# Patient Record
Sex: FEMALE | Race: WHITE | Employment: OTHER | URBAN - NONMETROPOLITAN AREA
[De-identification: names, ages, dates, MRNs, and addresses within clinical notes are randomized per-mention and may not be internally consistent; named-entity substitution may affect disease eponyms.]

---

## 2017-01-01 ENCOUNTER — APPOINTMENT (OUTPATIENT)
Dept: GENERAL RADIOLOGY | Age: 67
End: 2017-01-01
Payer: MEDICARE

## 2017-01-01 ENCOUNTER — APPOINTMENT (OUTPATIENT)
Dept: CT IMAGING | Age: 67
End: 2017-01-01
Payer: MEDICARE

## 2017-01-01 ENCOUNTER — HOSPITAL ENCOUNTER (OUTPATIENT)
Age: 67
Setting detail: OBSERVATION
Discharge: HOME | End: 2017-12-18
Attending: EMERGENCY MEDICINE | Admitting: INTERNAL MEDICINE
Payer: MEDICARE

## 2017-01-01 VITALS
OXYGEN SATURATION: 93 % | HEART RATE: 91 BPM | DIASTOLIC BLOOD PRESSURE: 69 MMHG | SYSTOLIC BLOOD PRESSURE: 107 MMHG | WEIGHT: 133.38 LBS | TEMPERATURE: 97.7 F | RESPIRATION RATE: 20 BRPM | HEIGHT: 62 IN | BODY MASS INDEX: 24.54 KG/M2

## 2017-01-01 DIAGNOSIS — R01.1 HEART MURMUR: ICD-10-CM

## 2017-01-01 DIAGNOSIS — R55 SYNCOPE AND COLLAPSE: ICD-10-CM

## 2017-01-01 DIAGNOSIS — R26.2 AMBULATORY DYSFUNCTION: Primary | ICD-10-CM

## 2017-01-01 LAB
ALBUMIN SERPL-MCNC: 4.3 G/DL (ref 3.5–5.2)
ALP BLD-CCNC: 72 U/L (ref 35–104)
ALT SERPL-CCNC: 100 U/L (ref 5–33)
ANION GAP SERPL CALCULATED.3IONS-SCNC: 11 MMOL/L (ref 7–19)
ANION GAP SERPL CALCULATED.3IONS-SCNC: 20 MMOL/L (ref 7–19)
APTT: 22.9 SEC (ref 26–36.2)
AST SERPL-CCNC: 51 U/L (ref 5–32)
BASOPHILS ABSOLUTE: 0 K/UL (ref 0–0.2)
BASOPHILS ABSOLUTE: 0 K/UL (ref 0–0.2)
BASOPHILS RELATIVE PERCENT: 0.4 % (ref 0–1)
BASOPHILS RELATIVE PERCENT: 0.5 % (ref 0–1)
BILIRUB SERPL-MCNC: 0.8 MG/DL (ref 0.2–1.2)
BILIRUBIN URINE: ABNORMAL
BLOOD, URINE: NEGATIVE
BUN BLDV-MCNC: 19 MG/DL (ref 8–23)
BUN BLDV-MCNC: 36 MG/DL (ref 8–23)
CALCIUM SERPL-MCNC: 10.4 MG/DL (ref 8.8–10.2)
CALCIUM SERPL-MCNC: 7.9 MG/DL (ref 8.8–10.2)
CHLORIDE BLD-SCNC: 103 MMOL/L (ref 98–111)
CHLORIDE BLD-SCNC: 91 MMOL/L (ref 98–111)
CLARITY: CLEAR
CO2: 22 MMOL/L (ref 22–29)
CO2: 26 MMOL/L (ref 22–29)
COLOR: YELLOW
CREAT SERPL-MCNC: 0.6 MG/DL (ref 0.5–0.9)
CREAT SERPL-MCNC: 1 MG/DL (ref 0.5–0.9)
EKG P AXIS: 48 DEGREES
EKG P-R INTERVAL: 124 MS
EKG Q-T INTERVAL: 302 MS
EKG QRS DURATION: 66 MS
EKG QTC CALCULATION (BAZETT): 405 MS
EKG T AXIS: 87 DEGREES
EOSINOPHILS ABSOLUTE: 0 K/UL (ref 0–0.6)
EOSINOPHILS ABSOLUTE: 0.1 K/UL (ref 0–0.6)
EOSINOPHILS RELATIVE PERCENT: 0.1 % (ref 0–5)
EOSINOPHILS RELATIVE PERCENT: 1.2 % (ref 0–5)
GFR NON-AFRICAN AMERICAN: 55
GFR NON-AFRICAN AMERICAN: >60
GLUCOSE BLD-MCNC: 159 MG/DL (ref 70–99)
GLUCOSE BLD-MCNC: 167 MG/DL (ref 70–99)
GLUCOSE BLD-MCNC: 170 MG/DL (ref 74–109)
GLUCOSE BLD-MCNC: 179 MG/DL (ref 70–99)
GLUCOSE BLD-MCNC: 181 MG/DL (ref 70–99)
GLUCOSE BLD-MCNC: 186 MG/DL (ref 70–99)
GLUCOSE BLD-MCNC: 192 MG/DL (ref 70–99)
GLUCOSE BLD-MCNC: 221 MG/DL (ref 70–99)
GLUCOSE BLD-MCNC: 246 MG/DL (ref 70–99)
GLUCOSE BLD-MCNC: 341 MG/DL (ref 70–99)
GLUCOSE BLD-MCNC: 370 MG/DL (ref 74–109)
GLUCOSE URINE: >=1000 MG/DL
HBA1C MFR BLD: 7.4 %
HCT VFR BLD CALC: 32.7 % (ref 37–47)
HCT VFR BLD CALC: 39.9 % (ref 37–47)
HEMOGLOBIN: 10.9 G/DL (ref 12–16)
HEMOGLOBIN: 13.2 G/DL (ref 12–16)
INR BLD: 0.92 (ref 0.88–1.18)
KETONES, URINE: ABNORMAL MG/DL
LEUKOCYTE ESTERASE, URINE: NEGATIVE
LV EF: 50 %
LVEF MODALITY: NORMAL
LYMPHOCYTES ABSOLUTE: 1.5 K/UL (ref 1.1–4.5)
LYMPHOCYTES ABSOLUTE: 1.6 K/UL (ref 1.1–4.5)
LYMPHOCYTES RELATIVE PERCENT: 21.6 % (ref 20–40)
LYMPHOCYTES RELATIVE PERCENT: 35.9 % (ref 20–40)
MCH RBC QN AUTO: 29.7 PG (ref 27–31)
MCH RBC QN AUTO: 29.7 PG (ref 27–31)
MCHC RBC AUTO-ENTMCNC: 33.1 G/DL (ref 33–37)
MCHC RBC AUTO-ENTMCNC: 33.3 G/DL (ref 33–37)
MCV RBC AUTO: 89.1 FL (ref 81–99)
MCV RBC AUTO: 89.7 FL (ref 81–99)
MONOCYTES ABSOLUTE: 0.4 K/UL (ref 0–0.9)
MONOCYTES ABSOLUTE: 0.6 K/UL (ref 0–0.9)
MONOCYTES RELATIVE PERCENT: 8.2 % (ref 0–10)
MONOCYTES RELATIVE PERCENT: 9.2 % (ref 0–10)
NEUTROPHILS ABSOLUTE: 2.3 K/UL (ref 1.5–7.5)
NEUTROPHILS ABSOLUTE: 5.3 K/UL (ref 1.5–7.5)
NEUTROPHILS RELATIVE PERCENT: 53 % (ref 50–65)
NEUTROPHILS RELATIVE PERCENT: 69.4 % (ref 50–65)
NITRITE, URINE: NEGATIVE
PDW BLD-RTO: 13.7 % (ref 11.5–14.5)
PDW BLD-RTO: 13.9 % (ref 11.5–14.5)
PERFORMED ON: ABNORMAL
PH UA: 6
PLATELET # BLD: 202 K/UL (ref 130–400)
PLATELET # BLD: 299 K/UL (ref 130–400)
PMV BLD AUTO: 8.9 FL (ref 9.4–12.3)
PMV BLD AUTO: 9.2 FL (ref 9.4–12.3)
POTASSIUM SERPL-SCNC: 3.6 MMOL/L (ref 3.5–5)
POTASSIUM SERPL-SCNC: 4.5 MMOL/L (ref 3.5–5)
PROTEIN UA: NEGATIVE MG/DL
PROTHROMBIN TIME: 12.3 SEC (ref 12–14.6)
RBC # BLD: 3.67 M/UL (ref 4.2–5.4)
RBC # BLD: 4.45 M/UL (ref 4.2–5.4)
SODIUM BLD-SCNC: 133 MMOL/L (ref 136–145)
SODIUM BLD-SCNC: 140 MMOL/L (ref 136–145)
SPECIFIC GRAVITY UA: 1.04
TOTAL PROTEIN: 7.6 G/DL (ref 6.6–8.7)
TROPONIN: 0.03 NG/ML (ref 0–0.03)
TSH SERPL DL<=0.05 MIU/L-ACNC: 1.58 UIU/ML (ref 0.27–4.2)
UROBILINOGEN, URINE: 0.2 E.U./DL
WBC # BLD: 4.3 K/UL (ref 4.8–10.8)
WBC # BLD: 7.6 K/UL (ref 4.8–10.8)

## 2017-01-01 PROCEDURE — 36415 COLL VENOUS BLD VENIPUNCTURE: CPT

## 2017-01-01 PROCEDURE — 99285 EMERGENCY DEPT VISIT HI MDM: CPT | Performed by: EMERGENCY MEDICINE

## 2017-01-01 PROCEDURE — 70450 CT HEAD/BRAIN W/O DYE: CPT

## 2017-01-01 PROCEDURE — 84443 ASSAY THYROID STIM HORMONE: CPT

## 2017-01-01 PROCEDURE — 6360000002 HC RX W HCPCS: Performed by: INTERNAL MEDICINE

## 2017-01-01 PROCEDURE — 84484 ASSAY OF TROPONIN QUANT: CPT

## 2017-01-01 PROCEDURE — 99217 PR OBSERVATION CARE DISCHARGE MANAGEMENT: CPT | Performed by: INTERNAL MEDICINE

## 2017-01-01 PROCEDURE — 97116 GAIT TRAINING THERAPY: CPT

## 2017-01-01 PROCEDURE — 72125 CT NECK SPINE W/O DYE: CPT

## 2017-01-01 PROCEDURE — 99225 PR SBSQ OBSERVATION CARE/DAY 25 MINUTES: CPT | Performed by: INTERNAL MEDICINE

## 2017-01-01 PROCEDURE — 99220 PR INITIAL OBSERVATION CARE/DAY 70 MINUTES: CPT | Performed by: INTERNAL MEDICINE

## 2017-01-01 PROCEDURE — G0378 HOSPITAL OBSERVATION PER HR: HCPCS

## 2017-01-01 PROCEDURE — 99285 EMERGENCY DEPT VISIT HI MDM: CPT

## 2017-01-01 PROCEDURE — 96372 THER/PROPH/DIAG INJ SC/IM: CPT

## 2017-01-01 PROCEDURE — 2580000003 HC RX 258: Performed by: EMERGENCY MEDICINE

## 2017-01-01 PROCEDURE — 80053 COMPREHEN METABOLIC PANEL: CPT

## 2017-01-01 PROCEDURE — 81003 URINALYSIS AUTO W/O SCOPE: CPT

## 2017-01-01 PROCEDURE — 97162 PT EVAL MOD COMPLEX 30 MIN: CPT

## 2017-01-01 PROCEDURE — 6370000000 HC RX 637 (ALT 250 FOR IP): Performed by: INTERNAL MEDICINE

## 2017-01-01 PROCEDURE — 85730 THROMBOPLASTIN TIME PARTIAL: CPT

## 2017-01-01 PROCEDURE — 80048 BASIC METABOLIC PNL TOTAL CA: CPT

## 2017-01-01 PROCEDURE — 82948 REAGENT STRIP/BLOOD GLUCOSE: CPT

## 2017-01-01 PROCEDURE — 85025 COMPLETE CBC W/AUTO DIFF WBC: CPT

## 2017-01-01 PROCEDURE — 71010 XR CHEST PORTABLE: CPT

## 2017-01-01 PROCEDURE — 93005 ELECTROCARDIOGRAM TRACING: CPT

## 2017-01-01 PROCEDURE — G8979 MOBILITY GOAL STATUS: HCPCS

## 2017-01-01 PROCEDURE — G8978 MOBILITY CURRENT STATUS: HCPCS

## 2017-01-01 PROCEDURE — 93306 TTE W/DOPPLER COMPLETE: CPT

## 2017-01-01 PROCEDURE — 83036 HEMOGLOBIN GLYCOSYLATED A1C: CPT

## 2017-01-01 PROCEDURE — 2580000003 HC RX 258: Performed by: INTERNAL MEDICINE

## 2017-01-01 PROCEDURE — 73630 X-RAY EXAM OF FOOT: CPT

## 2017-01-01 PROCEDURE — 85610 PROTHROMBIN TIME: CPT

## 2017-01-01 RX ORDER — INSULIN GLARGINE 100 [IU]/ML
20 INJECTION, SOLUTION SUBCUTANEOUS NIGHTLY
Qty: 10 ML | Refills: 0
Start: 2017-01-01 | End: 2018-01-01 | Stop reason: SDUPTHER

## 2017-01-01 RX ORDER — ONDANSETRON 2 MG/ML
4 INJECTION INTRAMUSCULAR; INTRAVENOUS EVERY 6 HOURS PRN
Status: DISCONTINUED | OUTPATIENT
Start: 2017-01-01 | End: 2017-01-01 | Stop reason: HOSPADM

## 2017-01-01 RX ORDER — DEXTROSE MONOHYDRATE 50 MG/ML
100 INJECTION, SOLUTION INTRAVENOUS PRN
Status: DISCONTINUED | OUTPATIENT
Start: 2017-01-01 | End: 2017-01-01 | Stop reason: HOSPADM

## 2017-01-01 RX ORDER — BISACODYL 10 MG
10 SUPPOSITORY, RECTAL RECTAL DAILY PRN
Status: DISCONTINUED | OUTPATIENT
Start: 2017-01-01 | End: 2017-01-01 | Stop reason: HOSPADM

## 2017-01-01 RX ORDER — 0.9 % SODIUM CHLORIDE 0.9 %
1000 INTRAVENOUS SOLUTION INTRAVENOUS ONCE
Status: COMPLETED | OUTPATIENT
Start: 2017-01-01 | End: 2017-01-01

## 2017-01-01 RX ORDER — NICOTINE POLACRILEX 4 MG
15 LOZENGE BUCCAL PRN
Status: DISCONTINUED | OUTPATIENT
Start: 2017-01-01 | End: 2017-01-01 | Stop reason: HOSPADM

## 2017-01-01 RX ORDER — ASPIRIN 81 MG/1
81 TABLET ORAL DAILY
Status: DISCONTINUED | OUTPATIENT
Start: 2017-01-01 | End: 2017-01-01 | Stop reason: HOSPADM

## 2017-01-01 RX ORDER — INSULIN GLARGINE 100 [IU]/ML
20 INJECTION, SOLUTION SUBCUTANEOUS NIGHTLY
Status: DISCONTINUED | OUTPATIENT
Start: 2017-01-01 | End: 2017-01-01 | Stop reason: HOSPADM

## 2017-01-01 RX ORDER — SODIUM CHLORIDE 9 MG/ML
INJECTION, SOLUTION INTRAVENOUS CONTINUOUS
Status: DISCONTINUED | OUTPATIENT
Start: 2017-01-01 | End: 2017-01-01 | Stop reason: HOSPADM

## 2017-01-01 RX ORDER — ATORVASTATIN CALCIUM 40 MG/1
40 TABLET, FILM COATED ORAL NIGHTLY
Status: DISCONTINUED | OUTPATIENT
Start: 2017-01-01 | End: 2017-01-01 | Stop reason: HOSPADM

## 2017-01-01 RX ORDER — LANOLIN ALCOHOL/MO/W.PET/CERES
3 CREAM (GRAM) TOPICAL NIGHTLY
Status: DISCONTINUED | OUTPATIENT
Start: 2017-01-01 | End: 2017-01-01 | Stop reason: HOSPADM

## 2017-01-01 RX ORDER — CLOPIDOGREL BISULFATE 75 MG/1
75 TABLET ORAL DAILY
Status: DISCONTINUED | OUTPATIENT
Start: 2017-01-01 | End: 2017-01-01 | Stop reason: HOSPADM

## 2017-01-01 RX ORDER — DONEPEZIL HYDROCHLORIDE 5 MG/1
10 TABLET, FILM COATED ORAL NIGHTLY
Status: DISCONTINUED | OUTPATIENT
Start: 2017-01-01 | End: 2017-01-01 | Stop reason: HOSPADM

## 2017-01-01 RX ORDER — DOCUSATE SODIUM 100 MG/1
100 CAPSULE, LIQUID FILLED ORAL 2 TIMES DAILY
Status: DISCONTINUED | OUTPATIENT
Start: 2017-01-01 | End: 2017-01-01 | Stop reason: HOSPADM

## 2017-01-01 RX ORDER — DEXTROSE MONOHYDRATE 25 G/50ML
12.5 INJECTION, SOLUTION INTRAVENOUS PRN
Status: DISCONTINUED | OUTPATIENT
Start: 2017-01-01 | End: 2017-01-01 | Stop reason: HOSPADM

## 2017-01-01 RX ORDER — ACETAMINOPHEN 325 MG/1
650 TABLET ORAL EVERY 4 HOURS PRN
Status: DISCONTINUED | OUTPATIENT
Start: 2017-01-01 | End: 2017-01-01 | Stop reason: HOSPADM

## 2017-01-01 RX ADMIN — DOCUSATE SODIUM 100 MG: 100 CAPSULE, LIQUID FILLED ORAL at 20:07

## 2017-01-01 RX ADMIN — DOCUSATE SODIUM 100 MG: 100 CAPSULE, LIQUID FILLED ORAL at 08:06

## 2017-01-01 RX ADMIN — ASPIRIN 81 MG: 81 TABLET, COATED ORAL at 07:35

## 2017-01-01 RX ADMIN — SODIUM CHLORIDE 1000 ML: 9 INJECTION, SOLUTION INTRAVENOUS at 19:47

## 2017-01-01 RX ADMIN — SODIUM CHLORIDE 1000 ML: 9 INJECTION, SOLUTION INTRAVENOUS at 21:37

## 2017-01-01 RX ADMIN — INSULIN LISPRO 4 UNITS: 100 INJECTION, SOLUTION INTRAVENOUS; SUBCUTANEOUS at 13:03

## 2017-01-01 RX ADMIN — ASPIRIN 81 MG: 81 TABLET, COATED ORAL at 08:06

## 2017-01-01 RX ADMIN — Medication 3 MG: at 20:07

## 2017-01-01 RX ADMIN — ENOXAPARIN SODIUM 40 MG: 40 INJECTION SUBCUTANEOUS at 07:35

## 2017-01-01 RX ADMIN — SODIUM CHLORIDE: 9 INJECTION, SOLUTION INTRAVENOUS at 22:59

## 2017-01-01 RX ADMIN — ATORVASTATIN CALCIUM 40 MG: 40 TABLET, FILM COATED ORAL at 20:07

## 2017-01-01 RX ADMIN — DOCUSATE SODIUM 100 MG: 100 CAPSULE, LIQUID FILLED ORAL at 07:35

## 2017-01-01 RX ADMIN — DOCUSATE SODIUM 100 MG: 100 CAPSULE, LIQUID FILLED ORAL at 22:59

## 2017-01-01 RX ADMIN — INSULIN LISPRO 4 UNITS: 100 INJECTION, SOLUTION INTRAVENOUS; SUBCUTANEOUS at 17:59

## 2017-01-01 RX ADMIN — DONEPEZIL HYDROCHLORIDE 10 MG: 5 TABLET, FILM COATED ORAL at 20:07

## 2017-01-01 RX ADMIN — ENOXAPARIN SODIUM 40 MG: 40 INJECTION SUBCUTANEOUS at 08:06

## 2017-01-01 RX ADMIN — CLOPIDOGREL BISULFATE 75 MG: 75 TABLET ORAL at 08:06

## 2017-01-01 RX ADMIN — INSULIN GLARGINE 20 UNITS: 100 INJECTION, SOLUTION SUBCUTANEOUS at 04:01

## 2017-01-01 RX ADMIN — SODIUM CHLORIDE: 9 INJECTION, SOLUTION INTRAVENOUS at 04:03

## 2017-01-01 RX ADMIN — ACETAMINOPHEN 650 MG: 325 TABLET ORAL at 07:40

## 2017-01-01 RX ADMIN — CLOPIDOGREL BISULFATE 75 MG: 75 TABLET ORAL at 07:35

## 2017-01-01 RX ADMIN — INSULIN LISPRO 2 UNITS: 100 INJECTION, SOLUTION INTRAVENOUS; SUBCUTANEOUS at 07:35

## 2017-01-01 ASSESSMENT — PAIN DESCRIPTION - LOCATION
LOCATION: ANKLE
LOCATION: ANKLE
LOCATION: FOOT

## 2017-01-01 ASSESSMENT — ENCOUNTER SYMPTOMS
ABDOMINAL PAIN: 0
DIARRHEA: 0
COUGH: 0
CHEST TIGHTNESS: 0
PHOTOPHOBIA: 0
VOMITING: 0
SORE THROAT: 0
EYE PAIN: 0
BACK PAIN: 0
SHORTNESS OF BREATH: 0
NAUSEA: 0
RHINORRHEA: 0

## 2017-01-01 ASSESSMENT — PAIN DESCRIPTION - PAIN TYPE
TYPE: ACUTE PAIN

## 2017-01-01 ASSESSMENT — PAIN SCALES - GENERAL
PAINLEVEL_OUTOF10: 10
PAINLEVEL_OUTOF10: 8

## 2017-01-01 ASSESSMENT — PAIN DESCRIPTION - ORIENTATION
ORIENTATION: LEFT;RIGHT
ORIENTATION: RIGHT
ORIENTATION: RIGHT

## 2017-01-01 ASSESSMENT — PAIN SCALES - WONG BAKER: WONGBAKER_NUMERICALRESPONSE: 4

## 2017-01-10 ENCOUNTER — OFFICE VISIT (OUTPATIENT)
Dept: PSYCHIATRY | Age: 67
End: 2017-01-10
Payer: MEDICARE

## 2017-01-10 VITALS
WEIGHT: 151.4 LBS | HEART RATE: 77 BPM | OXYGEN SATURATION: 94 % | DIASTOLIC BLOOD PRESSURE: 72 MMHG | BODY MASS INDEX: 27.86 KG/M2 | SYSTOLIC BLOOD PRESSURE: 121 MMHG | HEIGHT: 62 IN

## 2017-01-10 DIAGNOSIS — F32.9 REACTIVE DEPRESSION: Primary | ICD-10-CM

## 2017-01-10 PROCEDURE — 99214 OFFICE O/P EST MOD 30 MIN: CPT | Performed by: PSYCHIATRY & NEUROLOGY

## 2017-01-10 PROCEDURE — 90833 PSYTX W PT W E/M 30 MIN: CPT | Performed by: PSYCHIATRY & NEUROLOGY

## 2017-01-10 RX ORDER — DONEPEZIL HYDROCHLORIDE 10 MG/1
10 TABLET, FILM COATED ORAL NIGHTLY
Qty: 30 TABLET | Refills: 3 | Status: SHIPPED | OUTPATIENT
Start: 2017-01-10 | End: 2017-02-06 | Stop reason: CLARIF

## 2017-01-10 RX ORDER — CITALOPRAM 10 MG/1
10 TABLET ORAL DAILY
Qty: 30 TABLET | Refills: 3 | Status: SHIPPED | OUTPATIENT
Start: 2017-01-10 | End: 2017-02-06 | Stop reason: SDUPTHER

## 2017-01-10 RX ORDER — QUETIAPINE FUMARATE 50 MG/1
100 TABLET, FILM COATED ORAL NIGHTLY
Qty: 30 TABLET | Refills: 3 | Status: SHIPPED | OUTPATIENT
Start: 2017-01-10 | End: 2017-02-06 | Stop reason: SDUPTHER

## 2017-02-06 ENCOUNTER — OFFICE VISIT (OUTPATIENT)
Dept: PRIMARY CARE CLINIC | Age: 67
End: 2017-02-06
Payer: MEDICARE

## 2017-02-06 ENCOUNTER — CARE COORDINATOR VISIT (OUTPATIENT)
Dept: PRIMARY CARE CLINIC | Age: 67
End: 2017-02-06

## 2017-02-06 VITALS
SYSTOLIC BLOOD PRESSURE: 122 MMHG | WEIGHT: 148 LBS | BODY MASS INDEX: 29.06 KG/M2 | DIASTOLIC BLOOD PRESSURE: 80 MMHG | HEART RATE: 80 BPM | HEIGHT: 60 IN | OXYGEN SATURATION: 96 % | RESPIRATION RATE: 16 BRPM

## 2017-02-06 DIAGNOSIS — E78.5 HYPERLIPIDEMIA, UNSPECIFIED HYPERLIPIDEMIA TYPE: ICD-10-CM

## 2017-02-06 DIAGNOSIS — Z12.31 SCREENING MAMMOGRAM, ENCOUNTER FOR: ICD-10-CM

## 2017-02-06 DIAGNOSIS — Z23 NEED FOR PNEUMOCOCCAL VACCINATION: ICD-10-CM

## 2017-02-06 DIAGNOSIS — K21.9 GASTROESOPHAGEAL REFLUX DISEASE WITHOUT ESOPHAGITIS: ICD-10-CM

## 2017-02-06 DIAGNOSIS — Z23 NEEDS FLU SHOT: ICD-10-CM

## 2017-02-06 DIAGNOSIS — R41.3 MEMORY CHANGES: ICD-10-CM

## 2017-02-06 PROCEDURE — G8420 CALC BMI NORM PARAMETERS: HCPCS | Performed by: FAMILY MEDICINE

## 2017-02-06 PROCEDURE — 90670 PCV13 VACCINE IM: CPT | Performed by: FAMILY MEDICINE

## 2017-02-06 PROCEDURE — 99214 OFFICE O/P EST MOD 30 MIN: CPT | Performed by: FAMILY MEDICINE

## 2017-02-06 PROCEDURE — G8399 PT W/DXA RESULTS DOCUMENT: HCPCS | Performed by: FAMILY MEDICINE

## 2017-02-06 PROCEDURE — 3045F PR MOST RECENT HEMOGLOBIN A1C LEVEL 7.0-9.0%: CPT | Performed by: FAMILY MEDICINE

## 2017-02-06 PROCEDURE — 1123F ACP DISCUSS/DSCN MKR DOCD: CPT | Performed by: FAMILY MEDICINE

## 2017-02-06 PROCEDURE — G8484 FLU IMMUNIZE NO ADMIN: HCPCS | Performed by: FAMILY MEDICINE

## 2017-02-06 PROCEDURE — 4040F PNEUMOC VAC/ADMIN/RCVD: CPT | Performed by: FAMILY MEDICINE

## 2017-02-06 PROCEDURE — 1036F TOBACCO NON-USER: CPT | Performed by: FAMILY MEDICINE

## 2017-02-06 PROCEDURE — 1090F PRES/ABSN URINE INCON ASSESS: CPT | Performed by: FAMILY MEDICINE

## 2017-02-06 PROCEDURE — G0008 ADMIN INFLUENZA VIRUS VAC: HCPCS | Performed by: FAMILY MEDICINE

## 2017-02-06 PROCEDURE — G0009 ADMIN PNEUMOCOCCAL VACCINE: HCPCS | Performed by: FAMILY MEDICINE

## 2017-02-06 PROCEDURE — 3017F COLORECTAL CA SCREEN DOC REV: CPT | Performed by: FAMILY MEDICINE

## 2017-02-06 PROCEDURE — 90686 IIV4 VACC NO PRSV 0.5 ML IM: CPT | Performed by: FAMILY MEDICINE

## 2017-02-06 PROCEDURE — G8427 DOCREV CUR MEDS BY ELIG CLIN: HCPCS | Performed by: FAMILY MEDICINE

## 2017-02-06 PROCEDURE — 3014F SCREEN MAMMO DOC REV: CPT | Performed by: FAMILY MEDICINE

## 2017-02-06 RX ORDER — DONEPEZIL HYDROCHLORIDE 10 MG/1
10 TABLET, FILM COATED ORAL NIGHTLY
COMMUNITY
End: 2017-06-19 | Stop reason: SDUPTHER

## 2017-02-06 RX ORDER — LANOLIN ALCOHOL/MO/W.PET/CERES
3 CREAM (GRAM) TOPICAL DAILY
COMMUNITY

## 2017-02-06 ASSESSMENT — ENCOUNTER SYMPTOMS
SHORTNESS OF BREATH: 0
COUGH: 0
COLOR CHANGE: 0

## 2017-03-07 ENCOUNTER — CARE COORDINATION (OUTPATIENT)
Dept: PRIMARY CARE CLINIC | Age: 67
End: 2017-03-07

## 2017-04-18 ENCOUNTER — CARE COORDINATION (OUTPATIENT)
Dept: PRIMARY CARE CLINIC | Age: 67
End: 2017-04-18

## 2017-04-30 ENCOUNTER — HOSPITAL ENCOUNTER (EMERGENCY)
Age: 67
Discharge: HOME | End: 2017-04-30
Attending: EMERGENCY MEDICINE
Payer: MEDICARE

## 2017-04-30 ENCOUNTER — APPOINTMENT (OUTPATIENT)
Dept: GENERAL RADIOLOGY | Age: 67
End: 2017-04-30
Payer: MEDICARE

## 2017-04-30 VITALS
TEMPERATURE: 98.7 F | SYSTOLIC BLOOD PRESSURE: 104 MMHG | DIASTOLIC BLOOD PRESSURE: 92 MMHG | RESPIRATION RATE: 18 BRPM | OXYGEN SATURATION: 92 % | HEART RATE: 90 BPM

## 2017-04-30 DIAGNOSIS — R11.0 NAUSEA: Primary | ICD-10-CM

## 2017-04-30 LAB
ALBUMIN SERPL-MCNC: 4.4 G/DL (ref 3.5–5.2)
ALP BLD-CCNC: 78 U/L (ref 35–104)
ALT SERPL-CCNC: 847 U/L (ref 5–33)
ANION GAP SERPL CALCULATED.3IONS-SCNC: 24 MMOL/L (ref 7–19)
AST SERPL-CCNC: 357 U/L (ref 5–32)
BILIRUB SERPL-MCNC: 1.3 MG/DL (ref 0.2–1.2)
BUN BLDV-MCNC: 33 MG/DL (ref 8–23)
CALCIUM SERPL-MCNC: 9.3 MG/DL (ref 8.8–10.2)
CHLORIDE BLD-SCNC: 87 MMOL/L (ref 98–111)
CO2: 20 MMOL/L (ref 22–29)
CREAT SERPL-MCNC: 0.8 MG/DL (ref 0.5–0.9)
GFR NON-AFRICAN AMERICAN: >60
GLOBULIN: 3.6 G/DL
GLUCOSE BLD-MCNC: 305 MG/DL (ref 74–109)
HCT VFR BLD CALC: 45.2 % (ref 37–47)
HEMOGLOBIN: 15.4 G/DL (ref 12–16)
LIPASE: 22 U/L (ref 13–60)
MCH RBC QN AUTO: 30.6 PG (ref 27–31)
MCHC RBC AUTO-ENTMCNC: 34.1 G/DL (ref 33–37)
MCV RBC AUTO: 89.9 FL (ref 81–99)
PDW BLD-RTO: 13 % (ref 11.5–14.5)
PLATELET # BLD: 309 K/UL (ref 130–400)
PMV BLD AUTO: 9 FL (ref 7.4–10.4)
POTASSIUM SERPL-SCNC: 3.7 MMOL/L (ref 3.5–5)
RBC # BLD: 5.03 M/UL (ref 4.2–5.4)
SODIUM BLD-SCNC: 131 MMOL/L (ref 136–145)
TOTAL PROTEIN: 8 G/DL (ref 6.6–8.7)
WBC # BLD: 9.8 K/UL (ref 4.8–10.8)

## 2017-04-30 PROCEDURE — 96374 THER/PROPH/DIAG INJ IV PUSH: CPT

## 2017-04-30 PROCEDURE — 83690 ASSAY OF LIPASE: CPT

## 2017-04-30 PROCEDURE — 99283 EMERGENCY DEPT VISIT LOW MDM: CPT

## 2017-04-30 PROCEDURE — 99281 EMR DPT VST MAYX REQ PHY/QHP: CPT | Performed by: EMERGENCY MEDICINE

## 2017-04-30 PROCEDURE — 74000 XR ABDOMEN LIMITED (KUB): CPT

## 2017-04-30 PROCEDURE — 6360000002 HC RX W HCPCS: Performed by: EMERGENCY MEDICINE

## 2017-04-30 PROCEDURE — 80053 COMPREHEN METABOLIC PANEL: CPT

## 2017-04-30 PROCEDURE — 6370000000 HC RX 637 (ALT 250 FOR IP): Performed by: EMERGENCY MEDICINE

## 2017-04-30 PROCEDURE — 85027 COMPLETE CBC AUTOMATED: CPT

## 2017-04-30 PROCEDURE — 2580000003 HC RX 258: Performed by: EMERGENCY MEDICINE

## 2017-04-30 PROCEDURE — 96375 TX/PRO/DX INJ NEW DRUG ADDON: CPT

## 2017-04-30 PROCEDURE — 36415 COLL VENOUS BLD VENIPUNCTURE: CPT

## 2017-04-30 RX ORDER — 0.9 % SODIUM CHLORIDE 0.9 %
1000 INTRAVENOUS SOLUTION INTRAVENOUS ONCE
Status: COMPLETED | OUTPATIENT
Start: 2017-04-30 | End: 2017-04-30

## 2017-04-30 RX ORDER — ONDANSETRON 2 MG/ML
4 INJECTION INTRAMUSCULAR; INTRAVENOUS ONCE
Status: COMPLETED | OUTPATIENT
Start: 2017-04-30 | End: 2017-04-30

## 2017-04-30 RX ORDER — PROMETHAZINE HYDROCHLORIDE 25 MG/ML
12.5 INJECTION, SOLUTION INTRAMUSCULAR; INTRAVENOUS ONCE
Status: COMPLETED | OUTPATIENT
Start: 2017-04-30 | End: 2017-04-30

## 2017-04-30 RX ORDER — METOCLOPRAMIDE 10 MG/1
10 TABLET ORAL 4 TIMES DAILY
Qty: 120 TABLET | Refills: 0 | Status: SHIPPED | OUTPATIENT
Start: 2017-04-30 | End: 2017-06-19 | Stop reason: SDUPTHER

## 2017-04-30 RX ADMIN — SODIUM CHLORIDE 1000 ML: 9 INJECTION, SOLUTION INTRAVENOUS at 18:30

## 2017-04-30 RX ADMIN — PROMETHAZINE HYDROCHLORIDE 12.5 MG: 25 INJECTION, SOLUTION INTRAMUSCULAR; INTRAVENOUS at 18:27

## 2017-04-30 RX ADMIN — INSULIN HUMAN 6 UNITS: 100 INJECTION, SOLUTION PARENTERAL at 20:31

## 2017-04-30 RX ADMIN — ONDANSETRON 4 MG: 2 INJECTION INTRAMUSCULAR; INTRAVENOUS at 19:38

## 2017-04-30 ASSESSMENT — PAIN DESCRIPTION - LOCATION: LOCATION: ABDOMEN

## 2017-04-30 ASSESSMENT — PAIN SCALES - GENERAL: PAINLEVEL_OUTOF10: 5

## 2017-04-30 ASSESSMENT — PAIN DESCRIPTION - DESCRIPTORS: DESCRIPTORS: CONSTANT;CRAMPING

## 2017-05-01 ENCOUNTER — CARE COORDINATION (OUTPATIENT)
Dept: PRIMARY CARE CLINIC | Age: 67
End: 2017-05-01

## 2017-05-01 RX ORDER — QUETIAPINE FUMARATE 50 MG/1
100 TABLET, FILM COATED ORAL NIGHTLY
Qty: 60 TABLET | Refills: 0 | Status: SHIPPED | OUTPATIENT
Start: 2017-05-01 | End: 2017-06-19 | Stop reason: SDUPTHER

## 2017-05-16 RX ORDER — INSULIN GLARGINE 100 [IU]/ML
INJECTION, SOLUTION SUBCUTANEOUS
Qty: 10 ML | Refills: 5 | Status: SHIPPED | OUTPATIENT
Start: 2017-05-16 | End: 2017-11-01 | Stop reason: SDUPTHER

## 2017-05-22 ENCOUNTER — OFFICE VISIT (OUTPATIENT)
Dept: PSYCHIATRY | Age: 67
End: 2017-05-22
Payer: MEDICARE

## 2017-05-22 VITALS
HEIGHT: 62 IN | SYSTOLIC BLOOD PRESSURE: 128 MMHG | OXYGEN SATURATION: 95 % | HEART RATE: 100 BPM | WEIGHT: 146.5 LBS | BODY MASS INDEX: 26.96 KG/M2 | DIASTOLIC BLOOD PRESSURE: 69 MMHG

## 2017-05-22 DIAGNOSIS — G30.0 EARLY ONSET ALZHEIMER'S DISEASE WITH BEHAVIORAL DISTURBANCE (HCC): Primary | ICD-10-CM

## 2017-05-22 DIAGNOSIS — F02.818 EARLY ONSET ALZHEIMER'S DISEASE WITH BEHAVIORAL DISTURBANCE (HCC): Primary | ICD-10-CM

## 2017-05-22 DIAGNOSIS — F03.93 DEPRESSION DUE TO DEMENTIA: ICD-10-CM

## 2017-05-22 PROCEDURE — 99214 OFFICE O/P EST MOD 30 MIN: CPT | Performed by: PSYCHIATRY & NEUROLOGY

## 2017-05-22 PROCEDURE — 1090F PRES/ABSN URINE INCON ASSESS: CPT | Performed by: PSYCHIATRY & NEUROLOGY

## 2017-05-22 PROCEDURE — 3014F SCREEN MAMMO DOC REV: CPT | Performed by: PSYCHIATRY & NEUROLOGY

## 2017-05-22 PROCEDURE — 4040F PNEUMOC VAC/ADMIN/RCVD: CPT | Performed by: PSYCHIATRY & NEUROLOGY

## 2017-05-22 PROCEDURE — G8399 PT W/DXA RESULTS DOCUMENT: HCPCS | Performed by: PSYCHIATRY & NEUROLOGY

## 2017-05-22 PROCEDURE — 90833 PSYTX W PT W E/M 30 MIN: CPT | Performed by: PSYCHIATRY & NEUROLOGY

## 2017-05-22 PROCEDURE — 1123F ACP DISCUSS/DSCN MKR DOCD: CPT | Performed by: PSYCHIATRY & NEUROLOGY

## 2017-05-22 PROCEDURE — 3017F COLORECTAL CA SCREEN DOC REV: CPT | Performed by: PSYCHIATRY & NEUROLOGY

## 2017-05-22 PROCEDURE — 1036F TOBACCO NON-USER: CPT | Performed by: PSYCHIATRY & NEUROLOGY

## 2017-05-22 PROCEDURE — G8427 DOCREV CUR MEDS BY ELIG CLIN: HCPCS | Performed by: PSYCHIATRY & NEUROLOGY

## 2017-05-22 PROCEDURE — G8420 CALC BMI NORM PARAMETERS: HCPCS | Performed by: PSYCHIATRY & NEUROLOGY

## 2017-05-22 RX ORDER — CEPHALEXIN 500 MG/1
500 CAPSULE ORAL 2 TIMES DAILY
Qty: 10 CAPSULE | Refills: 0 | Status: SHIPPED | OUTPATIENT
Start: 2017-05-22 | End: 2017-05-27

## 2017-06-19 ENCOUNTER — OFFICE VISIT (OUTPATIENT)
Dept: PRIMARY CARE CLINIC | Age: 67
End: 2017-06-19
Payer: MEDICARE

## 2017-06-19 VITALS
HEIGHT: 62 IN | DIASTOLIC BLOOD PRESSURE: 88 MMHG | TEMPERATURE: 96.2 F | SYSTOLIC BLOOD PRESSURE: 128 MMHG | HEART RATE: 91 BPM | BODY MASS INDEX: 25.54 KG/M2 | OXYGEN SATURATION: 97 % | WEIGHT: 138.8 LBS

## 2017-06-19 DIAGNOSIS — R74.8 ELEVATED LIVER ENZYMES: ICD-10-CM

## 2017-06-19 DIAGNOSIS — F03.91 DEMENTIA WITH BEHAVIORAL DISTURBANCE, UNSPECIFIED DEMENTIA TYPE: ICD-10-CM

## 2017-06-19 DIAGNOSIS — E78.5 HYPERLIPIDEMIA, UNSPECIFIED HYPERLIPIDEMIA TYPE: ICD-10-CM

## 2017-06-19 LAB — HBA1C MFR BLD: 7 %

## 2017-06-19 PROCEDURE — 1036F TOBACCO NON-USER: CPT | Performed by: FAMILY MEDICINE

## 2017-06-19 PROCEDURE — G8427 DOCREV CUR MEDS BY ELIG CLIN: HCPCS | Performed by: FAMILY MEDICINE

## 2017-06-19 PROCEDURE — 3046F HEMOGLOBIN A1C LEVEL >9.0%: CPT | Performed by: FAMILY MEDICINE

## 2017-06-19 PROCEDURE — 1090F PRES/ABSN URINE INCON ASSESS: CPT | Performed by: FAMILY MEDICINE

## 2017-06-19 PROCEDURE — 99214 OFFICE O/P EST MOD 30 MIN: CPT | Performed by: FAMILY MEDICINE

## 2017-06-19 PROCEDURE — G8419 CALC BMI OUT NRM PARAM NOF/U: HCPCS | Performed by: FAMILY MEDICINE

## 2017-06-19 PROCEDURE — 1123F ACP DISCUSS/DSCN MKR DOCD: CPT | Performed by: FAMILY MEDICINE

## 2017-06-19 PROCEDURE — 3014F SCREEN MAMMO DOC REV: CPT | Performed by: FAMILY MEDICINE

## 2017-06-19 PROCEDURE — 4040F PNEUMOC VAC/ADMIN/RCVD: CPT | Performed by: FAMILY MEDICINE

## 2017-06-19 PROCEDURE — 3017F COLORECTAL CA SCREEN DOC REV: CPT | Performed by: FAMILY MEDICINE

## 2017-06-19 PROCEDURE — 83036 HEMOGLOBIN GLYCOSYLATED A1C: CPT | Performed by: FAMILY MEDICINE

## 2017-06-19 PROCEDURE — G8399 PT W/DXA RESULTS DOCUMENT: HCPCS | Performed by: FAMILY MEDICINE

## 2017-06-19 RX ORDER — QUETIAPINE FUMARATE 50 MG/1
100 TABLET, FILM COATED ORAL NIGHTLY
Qty: 60 TABLET | Refills: 0 | Status: SHIPPED | OUTPATIENT
Start: 2017-06-19 | End: 2017-07-21 | Stop reason: SDUPTHER

## 2017-06-19 RX ORDER — METOCLOPRAMIDE 10 MG/1
10 TABLET ORAL 4 TIMES DAILY
Qty: 120 TABLET | Refills: 0 | Status: SHIPPED | OUTPATIENT
Start: 2017-06-19 | End: 2017-07-21 | Stop reason: SDUPTHER

## 2017-06-19 RX ORDER — DONEPEZIL HYDROCHLORIDE 10 MG/1
10 TABLET, FILM COATED ORAL NIGHTLY
Qty: 30 TABLET | Refills: 5 | Status: SHIPPED | OUTPATIENT
Start: 2017-06-19 | End: 2017-11-28 | Stop reason: SDUPTHER

## 2017-06-19 ASSESSMENT — ENCOUNTER SYMPTOMS
COLOR CHANGE: 0
SHORTNESS OF BREATH: 0
COUGH: 0

## 2017-06-26 DIAGNOSIS — E78.5 HYPERLIPIDEMIA, UNSPECIFIED HYPERLIPIDEMIA TYPE: ICD-10-CM

## 2017-06-26 LAB
ALBUMIN SERPL-MCNC: 4.1 G/DL (ref 3.5–5.2)
ALP BLD-CCNC: 52 U/L (ref 35–104)
ALT SERPL-CCNC: 10 U/L (ref 5–33)
ANION GAP SERPL CALCULATED.3IONS-SCNC: 19 MMOL/L (ref 7–19)
AST SERPL-CCNC: 24 U/L (ref 5–32)
BILIRUB SERPL-MCNC: 0.4 MG/DL (ref 0.2–1.2)
BUN BLDV-MCNC: 24 MG/DL (ref 8–23)
CALCIUM SERPL-MCNC: 10.1 MG/DL (ref 8.8–10.2)
CHLORIDE BLD-SCNC: 98 MMOL/L (ref 98–111)
CHOLESTEROL, TOTAL: 135 MG/DL (ref 160–199)
CO2: 22 MMOL/L (ref 22–29)
CREAT SERPL-MCNC: 0.8 MG/DL (ref 0.5–0.9)
GFR NON-AFRICAN AMERICAN: >60
GLUCOSE BLD-MCNC: 168 MG/DL (ref 74–109)
HDLC SERPL-MCNC: 51 MG/DL (ref 65–121)
LDL CHOLESTEROL CALCULATED: 42 MG/DL
POTASSIUM SERPL-SCNC: 4.4 MMOL/L (ref 3.5–5)
SODIUM BLD-SCNC: 139 MMOL/L (ref 136–145)
TOTAL PROTEIN: 7.5 G/DL (ref 6.6–8.7)
TRIGL SERPL-MCNC: 211 MG/DL (ref 150–199)

## 2017-06-27 ENCOUNTER — CARE COORDINATION (OUTPATIENT)
Dept: PRIMARY CARE CLINIC | Age: 67
End: 2017-06-27

## 2017-08-10 RX ORDER — CITALOPRAM 10 MG/1
TABLET ORAL
Qty: 30 TABLET | Refills: 5 | Status: SHIPPED | OUTPATIENT
Start: 2017-08-10

## 2017-08-24 RX ORDER — ATORVASTATIN CALCIUM 40 MG/1
TABLET, FILM COATED ORAL
Qty: 30 TABLET | Refills: 0 | Status: SHIPPED | OUTPATIENT
Start: 2017-08-24 | End: 2017-11-01 | Stop reason: SDUPTHER

## 2017-09-25 ENCOUNTER — OFFICE VISIT (OUTPATIENT)
Dept: PRIMARY CARE CLINIC | Age: 67
End: 2017-09-25
Payer: MEDICARE

## 2017-09-25 VITALS
HEART RATE: 92 BPM | BODY MASS INDEX: 27.34 KG/M2 | OXYGEN SATURATION: 95 % | TEMPERATURE: 98.3 F | WEIGHT: 148.6 LBS | SYSTOLIC BLOOD PRESSURE: 120 MMHG | DIASTOLIC BLOOD PRESSURE: 62 MMHG | HEIGHT: 62 IN

## 2017-09-25 DIAGNOSIS — F03.91 DEMENTIA WITH BEHAVIORAL DISTURBANCE, UNSPECIFIED DEMENTIA TYPE: ICD-10-CM

## 2017-09-25 DIAGNOSIS — Z12.31 SCREENING MAMMOGRAM, ENCOUNTER FOR: ICD-10-CM

## 2017-09-25 DIAGNOSIS — Z23 NEEDS FLU SHOT: ICD-10-CM

## 2017-09-25 PROCEDURE — 1090F PRES/ABSN URINE INCON ASSESS: CPT | Performed by: FAMILY MEDICINE

## 2017-09-25 PROCEDURE — 90688 IIV4 VACCINE SPLT 0.5 ML IM: CPT | Performed by: FAMILY MEDICINE

## 2017-09-25 PROCEDURE — 3014F SCREEN MAMMO DOC REV: CPT | Performed by: FAMILY MEDICINE

## 2017-09-25 PROCEDURE — G8427 DOCREV CUR MEDS BY ELIG CLIN: HCPCS | Performed by: FAMILY MEDICINE

## 2017-09-25 PROCEDURE — 1036F TOBACCO NON-USER: CPT | Performed by: FAMILY MEDICINE

## 2017-09-25 PROCEDURE — 4040F PNEUMOC VAC/ADMIN/RCVD: CPT | Performed by: FAMILY MEDICINE

## 2017-09-25 PROCEDURE — 3046F HEMOGLOBIN A1C LEVEL >9.0%: CPT | Performed by: FAMILY MEDICINE

## 2017-09-25 PROCEDURE — G0008 ADMIN INFLUENZA VIRUS VAC: HCPCS | Performed by: FAMILY MEDICINE

## 2017-09-25 PROCEDURE — G8417 CALC BMI ABV UP PARAM F/U: HCPCS | Performed by: FAMILY MEDICINE

## 2017-09-25 PROCEDURE — G8399 PT W/DXA RESULTS DOCUMENT: HCPCS | Performed by: FAMILY MEDICINE

## 2017-09-25 PROCEDURE — 3017F COLORECTAL CA SCREEN DOC REV: CPT | Performed by: FAMILY MEDICINE

## 2017-09-25 PROCEDURE — 99214 OFFICE O/P EST MOD 30 MIN: CPT | Performed by: FAMILY MEDICINE

## 2017-09-25 PROCEDURE — 1123F ACP DISCUSS/DSCN MKR DOCD: CPT | Performed by: FAMILY MEDICINE

## 2017-09-25 RX ORDER — ESOMEPRAZOLE MAGNESIUM 40 MG/1
CAPSULE, DELAYED RELEASE ORAL
Qty: 90 CAPSULE | Refills: 3 | Status: SHIPPED | OUTPATIENT
Start: 2017-09-25 | End: 2017-01-01

## 2017-09-25 ASSESSMENT — ENCOUNTER SYMPTOMS
COLOR CHANGE: 0
COUGH: 0
SHORTNESS OF BREATH: 0

## 2017-10-09 ENCOUNTER — HOSPITAL ENCOUNTER (OUTPATIENT)
Dept: WOMENS IMAGING | Age: 67
Discharge: HOME | End: 2017-10-09
Payer: MEDICARE

## 2017-10-09 DIAGNOSIS — Z12.31 SCREENING MAMMOGRAM, ENCOUNTER FOR: ICD-10-CM

## 2017-10-09 PROCEDURE — G0202 SCR MAMMO BI INCL CAD: HCPCS

## 2017-10-09 PROCEDURE — 77063 BREAST TOMOSYNTHESIS BI: CPT

## 2017-10-10 ENCOUNTER — TELEPHONE (OUTPATIENT)
Dept: WOMENS IMAGING | Age: 67
End: 2017-10-10

## 2017-10-10 ENCOUNTER — TELEPHONE (OUTPATIENT)
Dept: PRIMARY CARE CLINIC | Age: 67
End: 2017-10-10

## 2017-10-10 NOTE — TELEPHONE ENCOUNTER
----- Message from Darleen Scott MD sent at 10/10/2017  8:19 AM CDT -----  Please inform patient of abnormal test results. Impression    1. Abnormal left mammogram. Follow-up ultrasound recommended.  BI-RADS  0.  2. Stable mammographic appearance of the right breast.

## 2017-10-10 NOTE — TELEPHONE ENCOUNTER
This [facility] called and gave the patients their mammogram results. Patients sister stated understanding, and that they have been unable to reach anyone to schedule the US so this [address] that when they open in the morning an appointment will be made and then notify them of date and time.

## 2017-10-11 NOTE — TELEPHONE ENCOUNTER
This [facility] called and left a message letting Masood Wilkerson know that the patient is scheduled for he Left breast US on 10/17/2017 at 2:30pm.

## 2017-10-17 ENCOUNTER — TELEPHONE (OUTPATIENT)
Dept: PRIMARY CARE CLINIC | Age: 67
End: 2017-10-17

## 2017-10-17 ENCOUNTER — HOSPITAL ENCOUNTER (OUTPATIENT)
Dept: WOMENS IMAGING | Age: 67
Discharge: HOME | End: 2017-10-17
Payer: MEDICARE

## 2017-10-17 DIAGNOSIS — N63.20 LEFT BREAST MASS: ICD-10-CM

## 2017-10-17 PROCEDURE — 76642 ULTRASOUND BREAST LIMITED: CPT

## 2017-10-17 NOTE — TELEPHONE ENCOUNTER
This [facility] spoke to the patients sister and she stated that they were given the results and that they would be contacted by the [facility] for a follow up [address]

## 2017-10-17 NOTE — PROGRESS NOTES
Please inform patient of abnormal test results. Make sure follow up ultrasound schedule   Impression    1. 5 mm probably benign nodule superficially in the left breast upper  outer quadrant, described above in detail. Ultrasound imaging  follow-up recommended. 2. ACR BI-RADS Category 3  BI-RADS CATEGORY 3: Probably benign finding.

## 2017-11-28 RX ORDER — ATORVASTATIN CALCIUM 40 MG/1
TABLET, FILM COATED ORAL
Qty: 30 TABLET | Refills: 0 | Status: SHIPPED | OUTPATIENT
Start: 2017-11-28 | End: 2018-01-01 | Stop reason: SDUPTHER

## 2017-11-28 RX ORDER — DONEPEZIL HYDROCHLORIDE 10 MG/1
TABLET, FILM COATED ORAL
Qty: 30 TABLET | Refills: 0 | Status: SHIPPED | OUTPATIENT
Start: 2017-11-28

## 2017-12-16 PROBLEM — R55 SYNCOPE AND COLLAPSE: Status: ACTIVE | Noted: 2017-01-01

## 2017-12-16 NOTE — Clinical Note
Patient Class: Observation [104]   REQUIRED: Diagnosis: Syncope and collapse [780. 2. ICD-9-CM]   Estimated Length of Stay: Estimated stay of less than 2 midnights   Future Attending Provider: Rochelle Houser [[id number]]   Admitting Provider: Marbella Goldsmith [[id number]]   Telemetry Bed Required?: Yes
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
## 2017-12-17 NOTE — PROGRESS NOTES
12 hour chart check complete. Pt resting in bed not showing any S/S of distress. Fall precautions in place. Pt oriented to room upon arrival.  Will continue to monitor pt. Pt's sister is interested in finding SNF placement for patient; preferably in [place] [id number]. Pt's sister's contact information in chart.   Electronically signed by Rohini Reyna RN on 12/17/2017 at 1:07 AM

## 2017-12-17 NOTE — PROGRESS NOTES
Progress Note  12/17/2017 10:19 AM  Subjective:   Admit Date: 12/16/2017  PCP: Desirae Best MD      Subjective: pt seen and examined. Confused/poor historian. No family available. Doesn't know why she is here but says her R leg hurts a little. ROS: 14 point review of systems is negative except as specifically addressed above. DIET CARDIAC;     Intake/Output Summary (Last 24 hours) at 12/17/17 1019  Last data filed at 12/17/17 0541   Gross per 24 hour   Intake              100 ml   Output              500 ml   Net             -400 ml     Medications:   dextrose      sodium chloride 75 mL/hr at 12/16/17 2259     Current Facility-Administered Medications   Medication Dose Route Frequency Provider Last Rate Last Dose    glucose (GLUTOSE) 40 % oral gel 15 g  15 g Oral PRN Brad Cain MD        dextrose 50 % solution 12.5 g  12.5 g Intravenous PRN Brad Cani MD        glucagon (rDNA) injection 1 mg  1 mg Intramuscular PRN Janusz Kramer MD        dextrose 5 % solution  100 mL/hr Intravenous PRN Janusz Kramer MD        insulin lispro (HUMALOG) injection vial 0-12 Units  0-12 Units Subcutaneous TID WC Brad Cain MD   2 Units at 12/17/17 0735    insulin lispro (HUMALOG) injection vial 0-6 Units  0-6 Units Subcutaneous Nightly Brad Cain MD        aspirin EC tablet 81 mg  81 mg Oral Daily Brad Cain MD   81 mg at 12/17/17 0735    atorvastatin (LIPITOR) tablet 40 mg  40 mg Oral Nightly Brad Cain MD        clopidogrel (PLAVIX) tablet 75 mg  75 mg Oral Daily Brad Cain MD   75 mg at 12/17/17 0735    donepezil (ARICEPT) tablet 10 mg  10 mg Oral Nightly Brad Cain MD        melatonin tablet 3 mg  3 mg Oral Nightly Brad Cain MD        insulin glargine (LANTUS) injection vial 20 Units  20 Units Subcutaneous Nightly Brad Cain MD   20 Units at 12/17/17 0401    0.9 % sodium chloride infusion   Intravenous Continuous Janusz Kramer MD 75 mL/hr at 12/16/17 2259      acetaminophen (TYLENOL) tablet 650 mg  650 mg Oral Q4H PRN Gilma Heck MD   650 mg at 12/17/17 0740    docusate sodium (COLACE) capsule 100 mg  100 mg Oral BID Jenene Boas Coplin, MD   100 mg at 12/17/17 0735    bisacodyl (DULCOLAX) suppository 10 mg  10 mg Rectal Daily PRN Gilma Heck MD        ondansetron (ZOFRAN) injection 4 mg  4 mg Intravenous Q6H PRN Gilma Heck MD        enoxaparin (LOVENOX) injection 40 mg  40 mg Subcutaneous Daily Gilma Heck MD   40 mg at 12/17/17 0735        Labs:     Recent Labs      12/16/17 1816 12/17/17   0621   WBC  7.6  4.3*   RBC  4.45  3.67*   HGB  13.2  10.9*   HCT  39.9  32.7*   MCV  89.7  89.1   MCH  29.7  29.7   MCHC  33.1  33.3   PLT  299  202     Recent Labs      12/16/17 1816 12/17/17   0621   NA  133*  140   K  4.5  3.6   ANIONGAP  20*  11   CL  91*  103   CO2  22  26   BUN  36*  19   CREATININE  1.0*  0.6   GLUCOSE  370*  170*   CALCIUM  10.4*  7.9*     No results for input(s): MG, PHOS in the last 72 hours. Recent Labs      12/16/17 1816   AST  51*   ALT  100*   BILITOT  0.8   ALKPHOS  72     ABGs:No results for input(s): PHART, KNE8HWP, PO2ART, HDV4LPW, BEART, HGBAE, J8JZESEL, CARBOXHGBART, 02THERAPY in the last 72 hours.   HgBA1c:   Recent Labs      12/17/17   0621   LABA1C  7.4*     FLP:  Lab Results   Component Value Date    TRIG 211 06/26/2017    HDL 51 06/26/2017    LDLCALC 42 06/26/2017    LDLDIRECT 125 10/12/2015     TSH:    Lab Results   Component Value Date    TSH 1.580 12/16/2017     Troponin T:   Recent Labs      12/16/17 1816   TROPONINI  0.03     INR:   Recent Labs      12/16/17 1816   INR  0.92       Objective:   Vitals: BP (!) 96/51   Pulse 94   Temp 96.3 °F (35.7 °C) (Temporal)   Resp 16   Ht 5' 2\" (1.575 m)   Wt 133 lb 6 oz (60.5 kg)   SpO2 90%   BMI 24.39 kg/m²   24HR INTAKE/OUTPUT:    Intake/Output Summary (Last 24 hours) at 12/17/17 1019  Last data filed at 12/17/17 0541   Gross per 24 hour   Intake              100 ml   Output              500 ml   Net             -400 ml     General appearance: NAD, alert and cooperative with exam  HEENT: atraumatic, PERRLA, EOMI, anicteric, trachea midline  Lungs: no increased work of breathing, CTAB, no wheezing/rhonchi/rales  Heart: RRR, +s1/s2, no rub  Abdomen: soft, nt/nd, +BS, no rebound/gaurding  Extremities: R ankle wrapped, no swelling of toes, normal foot movement, +DP  Neurologic: AAOx1, no focal deficits  Skin: no rashes  Psych: Normal affect      Assessment and Plan:   Principal Problem:    Fall - pt twisted her ankle and fell, no fractures found by imaging. Some concern by ER that maybe she passed out although pt is poor historian and can not confirm or deny. So far tele ok. No signs of infection. Awaiting 2d echo read.   Active Problems:    Uncontrolled type 2 diabetes mellitus with complication ([facility] [address] - iss    Dementia without behavioral disturbance - home meds    Syncope and collapse - questionable syncope, awaiting echo and monitoring on tele      Advance Directive: Full Code    D/c home tomorrow if no overnight events and echo normal      Electronically signed by Meet Hickman DO on 12/17/2017 at 10:19 AM

## 2017-12-17 NOTE — PROGRESS NOTES
Physical Therapy  Name: Alena Baeza  MRN:  [id number]  Date of service:  12/17/2017 12/17/17 1543   Restrictions/Precautions   Restrictions/Precautions Fall Risk   Required Braces or Orthoses? No   General   Chart Reviewed Yes   Response To Previous Treatment Not applicable   Family / Caregiver Present Yes   Referring Practitioner Dr. Azeb Haro to try the walker in the hallway. General Comment   Comments RN, Tamara Garduno PT. Family present and reports frequent falls in pt. and that pt. is poor historian. Pain Screening   Intervention List Patient able to continue with treatment   Oxygen Therapy   O2 Device None (Room air)   Bed Mobility   Bridging Contact guard assistance   Sit to Supine Minimal assistance   Scooting Contact guard assistance  (with v. cues)   Transfers   Sit to Stand Minimal Assistance   Stand to sit Minimal Assistance   Ambulation   Ambulation? Yes   WB Status no restrictions   Ambulation 1   Surface level tile   Device Rolling Walker   Assistance Minimal assistance   Quality of Gait slow pace, shuffling steps   Distance 90'   Comments v. cues to look ahead   Exercises   Comments encouraged to do AP as able   Patient Goals    Patient goals  get better   Short term goals   Time Frame for Short term goals 2 wks   Short term goal 1 supine to sit indep   Short term goal 2 sit to stand indep   Short term goal 3 amb. 200' with RW SBA   Conditions Requiring Skilled Therapeutic Intervention   Body structures, Functions, Activity limitations Decreased functional mobility ; Decreased ROM; Decreased strength;Decreased safe awareness;Decreased cognition;Decreased endurance;Decreased balance;Decreased coordination   Assessment Pt. will benefit from skilled PT to increase strength and decrease impairments. Pt. a fall risk due to decreased safety awareness, confusion and with history of falls, pt. should not attempt mobility on her own at this time.  Anticipate pt. will

## 2017-12-17 NOTE — PROGRESS NOTES
Physical Therapy    Facility/Department: [facility] ONCOLOGY UNIT  Initial Assessment    NAME: Alena Baeza  : 1950  MRN: [id number]    Date of Service: 2017    Patient Diagnosis(es): The primary encounter diagnosis was Ambulatory dysfunction. Diagnoses of Syncope and collapse and Heart murmur were also pertinent to this visit. has a past medical history of Anxiety; Cancer ([address] Depression; Dyslipidemia; Headache(784.0); Kidney stones; Lumbar disc disease; and Osteoarthritis. has a past surgical history that includes Appendectomy; Cholecystectomy; Tonsillectomy; hernia repair; Hysterectomy; Varicose vein surgery; Abdominal adhesion surgery; Skin cancer excision; Kidney stone surgery; Colonoscopy (5 years ago); Colonoscopy (); Upper gastrointestinal endoscopy (5 years ago); and Upper gastrointestinal endoscopy (). Restrictions  Restrictions/Precautions  Restrictions/Precautions: Fall Risk  Required Braces or Orthoses?: No  Vision/Hearing  Vision: Within Functional Limits  Hearing: Within functional limits     Subjective  General  Chart Reviewed: Yes  Patient assessed for rehabilitation services?: Yes  Response To Previous Treatment: Not applicable  Family / Caregiver Present: Yes (pt's sisters)  Referring Practitioner: Dr. Lindsey Sage  Referral Date : 17  Diagnosis: Ambulatory Dysfunction, syncope and collapse, heart murmur, failure to thrive, advanced dementia, R ankle sprain  Follows Commands: Within Functional Limits  General Comment  Comments: RNTamara PT. Family present and reports frequent falls in pt. and that pt. is poor historian. Subjective  Subjective: Pt. willing to get up and walk.  Needs to go to the bathroom 1st.  Pain Assessment  Pain Type: Acute pain  Pain Location: Ankle  Pain Orientation: Right  Oxygen Therapy  O2 Device: None (Room air)  Pre Treatment Pain Screening  Intervention List: Patient able to continue with falls, pt. should not attempt mobility on her own at this time. Anticipate pt. will need 24 hr. care upon d/c from [facility]. Doubtful that pt. would be able to care for herself at home. Treatment Diagnosis: impaired mobility and gait  Prognosis: Guarded  Decision Making: Medium Complexity  Patient Education: safety, staff A with mobility  Barriers to Learning: dementia  REQUIRES PT FOLLOW UP: Yes  Activity Tolerance  Activity Tolerance: Patient limited by cognitive status     Discharge Recommendations:  Continue to assess pending progress, Patient would benefit from continued therapy after discharge      Plan   Plan  Times per week: at least 5-7 days a week  Times per day: Daily  Plan weeks: 2  Current Treatment Recommendations: Strengthening, ROM, Balance Training, Functional Mobility Training, Transfer Training, Gait Training, Endurance Training, Safety Education & Training, Pain Management, Patient/Caregiver Education & Training, Equipment Evaluation, Education, & procurement, Positioning  Plan Comment: cont. PT per POC. Safety Devices  Type of devices:  (seated on toilet with intent to amb. further.)    G-Code  PT G-Codes  Functional Assessment Tool Used: sit to stand   Score: Min A  Functional Limitation: Mobility: Walking and moving around  Mobility: Walking and Moving Around Current Status (): At least 40 percent but less than 60 percent impaired, limited or restricted  Mobility: Walking and Moving Around Goal Status ():  At least 1 percent but less than 20 percent impaired, limited or restricted  OutComes Score                                           AM-PAC Score             Goals  Short term goals  Time Frame for Short term goals: 2 wks  Short term goal 1: supine to sit indep  Short term goal 2: sit to stand indep  Short term goal 3: amb. 200' with RW SBA  Patient Goals   Patient goals : get better       Therapy Time   Individual Concurrent Group Co-treatment   Time In           Time Out Minutes                   Deidre Scheuermann, PT    Electronically signed by Deidre Scheuermann, PT on 12/17/2017 at 3:42 PM

## 2017-12-17 NOTE — ED PROVIDER NOTES
[facility] ONCOLOGY UNIT  eMERGENCY dEPARTMENT eNCOUnter      Pt Name: Aura Turcios  MRN: [id number]  [place] 1950  Date of evaluation: 12/16/2017  Provider: Alissa Esposito MD    CHIEF COMPLAINT       Chief Complaint   Patient presents with    Loss of Consciousness     PT fell yesterday and today; PT states she believes she is passing out    Head Injury     takes plavix    Foot Injury     bilateral    Altered Mental Status       HISTORY OF PRESENT ILLNESS   (Location/Symptom, Timing/Onset, Context/Setting, Quality, Duration, Modifying Factors, Severity)  Note limiting factors. Aura Turcios is a 75 y.o. female who presents to the emergency department For multiple falls and possible syncopal event. Patient is a poor historian due to her dementia. She has fallen twice over the past 2 days. Uncertain if patient syncopized. She states that she knocked around into a bunch of stuff before falling, but doesn't know if she fainted after knocking him to stop. As per her sister patient is post-use a cane but does not want at home. She lives by herself and does not have home health or an aide. Patient has fallen at least twice in the past 2 days. She denies any prodrome of symptoms. She does state that she has right foot pain and swelling. She denies other complaints this point time. Unable to obtain further history due to patient's dementia and no witnesses during the events. Her sister does state that the 2 times that she came over the place had stuffed knocked over in different areas. She does also state that the patient does have a shuffling gait. Nursing Notes were reviewed. REVIEW OF SYSTEMS    (2-9 systems for level 4, 10 or more for level 5)     Review of Systems   Constitutional: Negative for activity change, appetite change, chills and fever. HENT: Negative for congestion, nosebleeds, rhinorrhea and sore throat. Eyes: Negative for photophobia, pain and visual disturbance.    Respiratory: atraumatic. Mouth/Throat: Oropharynx is clear and moist.   Eyes: EOM are normal. Pupils are equal, round, and reactive to light. No scleral icterus. Neck: Normal range of motion. Neck supple. No tracheal deviation present. Cardiovascular: Normal rate, regular rhythm and intact distal pulses. Exam reveals no gallop and no friction rub. Murmur heard. Systolic murmur heard throughout grade 2/6   Pulmonary/Chest: Effort normal and breath sounds normal. No respiratory distress. She has no wheezes. She has no rales. She exhibits no tenderness. Abdominal: Soft. She exhibits no distension and no mass. There is no tenderness. There is no rebound and no guarding. Musculoskeletal: Normal range of motion. She exhibits no edema, tenderness or deformity. Neurological: She is alert and oriented to person, place, and time. No cranial nerve deficit. She exhibits normal muscle tone. Coordination normal.   Skin: Skin is warm and dry. No rash noted. Psychiatric: She has a normal mood and affect. Her behavior is normal. Judgment and thought content normal.   Nursing note and vitals reviewed. DIAGNOSTIC RESULTS     EKG: All EKG's are interpreted by the Emergency Department Physician who either signs or Co-signs this chart in the absence of a cardiologist.    Norval Nunnery: Sinus tachycardia at 118 bpm, no ST elevations or depressions with in contiguous leads consistent with STEMI or ischemia, MD interval and 2 ms, QRS 73 ms,  no seconds, no Brugada-like pattern, no WPW pattern, no prolonged QTC. RADIOLOGY:   Non-plain film images such as CT, Ultrasound and MRI are read by the radiologist. Luzma Filter radiographic images are visualized and preliminarily interpreted by the emergency physician with the below findings:    CT Head WO Contrast   Final Result   Moderate cerebral and cerebellar volume loss with chronic   microvascular disease but no evidence of acute intracranial process.    Signed by Dr Marilu Brunner on Resp: 18 18 18 18   Temp:   99.3 °F (37.4 °C) 98.9 °F (37.2 °C)   TempSrc:   Oral Temporal   SpO2: 93% 94% 91% 92%   Weight:    133 lb 6 oz (60.5 kg)   Height:    5' 2\" (1.575 m)       MDM  Number of Diagnoses or Management Options  Diagnosis management comments:  67 feeling normal with history of dementia with possible syncope versus mechanical fall at home over the past 2 days. IV, labs, EKGs, troponins, CT head, chest x-ray, reevaluation. Emergency Department Course  Patient seen and evaluated, IV placed, labs drawn and sent, EKG obtained.have hyperglycemia. EKG did not reveal cause of the patient's syncope. There were no dysrhythmias. Labs were otherwise without significant/life threatening abnormalities. Given the patient's dementia and multiple falls versus syncope over the past 2 days I feel the patient would warrant observation and further investigation into the reason for the fainting in the falls. I also feel that social work to be involved in the patient's care given that she does live alone and has dementia with multiple medical problems and may not be taking her medications appropriately and does not have adequate care. Patient also was with difficulty ambulating/ambulatory dysfunction and an unsteady gait. Case was discussed with Dr. Timo Harrison and was admitted to the medicine service. CONSULTS:  IP CONSULT TO HOSPITALIST  IP CONSULT TO SOCIAL WORK    PROCEDURES:  Unless otherwise noted below, none     Procedures    FINAL IMPRESSION      1. Ambulatory dysfunction    2. Syncope and collapse    3.  Heart murmur          DISPOSITION/PLAN   DISPOSITION     PATIENT REFERRED TO:  Terrie Up MD  [facility] [address]            DISCHARGE MEDICATIONS:  Current Discharge Medication List             (Please note that portions of this note were completed with a voice recognition program.  Efforts were made to edit the dictations but occasionally words are mis-transcribed.)    Marco Chavez MD (electronically signed)  Attending Emergency Physician          Marco Chavez MD  12/16/17 [id number]

## 2017-12-17 NOTE — H&P
no swelling. Neurological: She is alert. She has normal strength. No cranial nerve deficit. GCS eye subscore is 4. GCS verbal subscore is 5. GCS motor subscore is 6. Skin: Skin is warm and dry. Bruising noted. Psychiatric: Her speech is normal.     DATA:  EKG:  I have reviewed EKG with the following interpretation:  Imaging:    CT Head WO Contrast   Final Result   Moderate cerebral and cerebellar volume loss with chronic   microvascular disease but no evidence of acute intracranial process. Signed by Dr Nina Mckeon on 12/16/2017 7:40 PM      CT Cervical Spine WO Contrast   Final Result   1. No evidence of acute osseous injury in the cervical spine. 2.   Arthritic changes in the cervical spine. Bony neural foraminal   encroachment is noted at several levels related uncinate spurring. Signed by Dr Nina Mckeon on 12/16/2017 7:39 PM      XR FOOT RIGHT (MIN 3 VIEWS)   Final Result   1.. Mild arthritic changes of the forefoot. 2. No evidence of acute fracture. Signed by Dr Nina Mckeon on 12/16/2017 7:35 PM      XR Chest Portable   Final Result   . No acute disease.    Signed by Dr Nina Mckeon on 12/16/2017 7:31 PM                 Labs Reviewed   CBC WITH AUTO DIFFERENTIAL - Abnormal; Notable for the following:        Result Value    MPV 9.2 (*)     Neutrophils % 69.4 (*)     All other components within normal limits   COMPREHENSIVE METABOLIC PANEL - Abnormal; Notable for the following:     Sodium 133 (*)     Chloride 91 (*)     Anion Gap 20 (*)     Glucose 370 (*)     BUN 36 (*)     CREATININE 1.0 (*)     GFR Non-African American 55 (*)     Calcium 10.4 (*)      (*)     AST 51 (*)     All other components within normal limits   URINALYSIS - Abnormal; Notable for the following:     Glucose, Ur >=1000 (*)     Bilirubin Urine SMALL (*)     Ketones, Urine TRACE (*)     All other components within normal limits   APTT - Abnormal; Notable for the following:     aPTT 22.9 (*)     All other

## 2017-12-17 NOTE — ED NOTES
Dr. Darin Bacon at bedside.  Pt given ice water per verbal order and warm blankets applied     Suyapa Basilio RN  12/16/17 2050

## 2017-12-18 NOTE — CARE COORDINATION
[place] accepted placement    [facility] P: [id number] F: [phone]

## 2017-12-18 NOTE — CARE COORDINATION
SW sent referrals to pt's sister (POA) top three choices. [address] [facility] P: [phone] F: [phone] [facility] P: [phone] F: [facility] [address] P: [phone] F: [phone]    Comments   Comment      Last edited by  on  at    Patient Information   Comment      Last edited by  on  at    Patient Name Date of Birth Age Social Security Number      Louise Dougherty 1950 75 y.o. [id number]    Sex Language            Female English      Admitting Provider Admission Date/Time Expected Length of Stay Length of Stay      Renu Griffin MD 2017 1802 1d 5h 0h    Discharge Provider Expected Discharge Date Discharge Disposition Discharge Destination       2017             Attending Provider: Mamadou Kelsey DO    Allergies: Adhesive Tape    Isolation: None   Infection: None   Code Status: FULL   Advance Care Planning Activity     Ht: 5' 2\" (1.575 m)   Wt: 133 lb 6 oz (60.5 kg)    Admission Cmt: None   Principal Problem: Fall [W19. XXXA]          Demographics   Comment      Last edited by  on  at    Address: Home Phone: Work Phone: Mobile Phone:   [address]   [address] [facility]    [phone] -- [phone]   SSN: Insurance: Marital Status: [place]:   [id number] Number   [facility]      Payor Information            PRIMARY INSURANCE   Payor: MEDICARE Plan:     Group Number:   Insurance Type: [address] Name: Ruchi Duron : 1950   Subscriber ID: [id number]       Pat. Rel. to Subscriber: Self       SECONDARY INSURANCE   Payor: [address]:     Group Number:   Insurance Type: [address] Name: Ruchi Duron : 1950   Subscriber ID: [id number]       Donita Etienne.  Rel. to Subscriber: SELF          Last H&P   H&P by Yale Galeazzi, MD at 2017 10:59 PM   Author: Yale Galeazzi, MD Specialty: Hospitalist Author Type: Physician   Filed: 2017  3:17 AM Date MOUTH EACH EVENING AT BEDTIME. QUEtiapine (SEROQUEL) 50 MG tablet, TAKE (2) TABLETS IN THE EVENING  metoclopramide (REGLAN) 10 MG tablet, TAKE (1) TABLET FOUR TIMES DAILY  melatonin 3 MG TABS tablet, Take 3 mg by mouth daily  Acetaminophen (TYLENOL 8 HOUR PO), Take 325 mg by mouth   aspirin 81 MG tablet, Take 81 mg by mouth daily. Allergies:  Adhesive tape     Social History:   TOBACCO:   reports that she has never smoked. She does not have any smokeless tobacco history on file. ETOH:   reports that she does not drink alcohol. Patient currently lives alone     Family History:   Family History             Problem Relation Age of Onset    Ovarian Cancer Mother      Diabetes Mother      Stroke Father      Breast Cancer Sister      Prostate Cancer Maternal Grandfather              I have personally reviewed above histories  REVIEW OF SYSTEMS:  Review of Systems   Unable to perform ROS: Dementia      PHYSICAL EXAM:  /65   Pulse 101   Temp 98.9 °F (37.2 °C) (Temporal)   Resp 18   Ht 5' 2\" (1.575 m)   Wt 133 lb 6 oz (60.5 kg)   SpO2 92%   BMI 24.39 kg/m²   Physical Exam   Constitutional: She appears well-developed and well-nourished. Non-toxic appearance. She does not have a sickly appearance. She does not appear ill. No distress. HENT:   Head: Normocephalic and atraumatic. Eyes: Conjunctivae and lids are normal. Pupils are equal, round, and reactive to light. Neck: Neck supple. No JVD present. Carotid bruit is not present. Cardiovascular: Normal rate, regular rhythm and normal heart sounds. Exam reveals no S3. Pulses:       Carotid pulses are 2+ on the right side, and 2+ on the left side. Radial pulses are 2+ on the right side, and 2+ on the left side. Pulmonary/Chest: Breath sounds normal. She has no decreased breath sounds. She has no wheezes. She has no rhonchi. She has no rales. Abdominal: Soft. Bowel sounds are normal. She exhibits no distension.  There is no robertway. General Comment   Comments RN, Magnus Hickman PT. Family present and reports frequent falls in pt. and that pt. is poor historian. Pain Screening   Intervention List Patient able to continue with treatment   Oxygen Therapy   O2 Device None (Room air)   Bed Mobility   Bridging Contact guard assistance   Sit to Supine Minimal assistance   Scooting Contact guard assistance  (with v. cues)   Transfers   Sit to Stand Minimal Assistance   Stand to sit Minimal Assistance   Ambulation   Ambulation? Yes   WB Status no restrictions   Ambulation 1   Surface level tile   Device Rolling Walker   Assistance Minimal assistance   Quality of Gait slow pace, shuffling steps   Distance 90'   Comments v. cues to look ahead   Exercises   Comments encouraged to do AP as able   Patient Goals    Patient goals  get better   Short term goals   Time Frame for Short term goals 2 wks   Short term goal 1 supine to sit indep   Short term goal 2 sit to stand indep   Short term goal 3 amb. 200' with RW SBA   Conditions Requiring Skilled Therapeutic Intervention   Body structures, Functions, Activity limitations Decreased functional mobility ; Decreased ROM; Decreased strength;Decreased safe awareness;Decreased cognition;Decreased endurance;Decreased balance;Decreased coordination   Assessment Pt. will benefit from skilled PT to increase strength and decrease impairments. Pt. a fall risk due to decreased safety awareness, confusion and with history of falls, pt. should not attempt mobility on her own at this time. Anticipate pt. will need 24 hr. care upon d/c from [facility]. Doubtful that pt. would be able to care for herself at home. Treatment Diagnosis impaired mobility and gait   Prognosis Guarded   Patient Education safety, staff A with mobility   Barriers to Learning dementia   REQUIRES PT FOLLOW UP Yes   Discharge Recommendations Continue to assess pending progress; Patient would benefit from continued therapy after discharge Activity Tolerance   Activity Tolerance Patient limited by cognitive status   Plan   Times per week at least 5-7 days a week   Times per day Daily   Plan weeks 2   Current Treatment Recommendations Strengthening;ROM;Balance Training;Functional Mobility Training;Transfer Training;Gait Training; Endurance Training; Safety Education & Training;Pain Management;Patient/Caregiver Education & Training;Equipment Evaluation, Education, & procurement;Positioning   Plan Comment cont. PT per POC. PT Whiteboard Notes   Therapy Whiteboard RE  ambulatory dysfunction, syncope and collapse, heart murmur, R ankle sprain dementia      Electronically signed by Mariano Peña PT on 2017 at 3:46 PM            Progress Notes by Mariano Peña PT at 2017  3:42 PM   Version 1 of 1   Author: Mariano Peña PT Service: Physical Therapy Author Type: Physical Therapist   Filed: 2017  3:42 PM Date of Service: 2017  3:42 PM Status: Signed   : Mariano Peña PT (Physical Therapist)     Physical Therapy     Facility/Department: [facility] ONCOLOGY UNIT  Initial Assessment     NAME: Beni Marah  : 1950  MRN: [id number]     Date of Service: 2017     Patient Diagnosis(es): The primary encounter diagnosis was Ambulatory dysfunction. Diagnoses of Syncope and collapse and Heart murmur were also pertinent to this visit. has a past medical history of Anxiety; Cancer ([address] Depression; Dyslipidemia; Headache(784.0); Kidney stones; Lumbar disc disease; and Osteoarthritis. has a past surgical history that includes Appendectomy; Cholecystectomy; Tonsillectomy; hernia repair; Hysterectomy; Varicose vein surgery; Abdominal adhesion surgery; Skin cancer excision; Kidney stone surgery; Colonoscopy (5 years ago); Colonoscopy (); Upper gastrointestinal endoscopy (5 years ago); and Upper gastrointestinal endoscopy (). assistance  Sit to Supine: Minimal assistance  Comment: pt. sat on EOB x 5 mins prior to standing  Transfers  Sit to Stand: Minimal Assistance (with RW)  Stand to sit: Minimal Assistance (on toilet)  Comment: pt. needing v. cues for toilet hygeine after using bathroom. Pt. given soapy washcloth for wiping hands. Ambulation  Ambulation?: Yes  WB Status: no restrictions  Ambulation 1  Surface: level tile  Device: Rolling Walker  Assistance: Minimal assistance  Quality of Gait: slow pace, shuffling steps  Distance: 10'     Balance  Posture: Fair  Sitting - Static: Good  Sitting - Dynamic: Good  Standing - Static: Fair;+  Standing - Dynamic: Fair         Assessment   Body structures, Functions, Activity limitations: Decreased functional mobility ; Decreased ROM; Decreased strength;Decreased safe awareness;Decreased cognition;Decreased endurance;Decreased balance;Decreased coordination  Assessment: Pt. will benefit from skilled PT to increase strength and decrease impairments. Pt. a fall risk due to decreased safety awareness, confusion and with history of falls, pt. should not attempt mobility on her own at this time. Anticipate pt. will need 24 hr. care upon d/c from [facility]. Doubtful that pt. would be able to care for herself at home.   Treatment Diagnosis: impaired mobility and gait  Prognosis: Guarded  Decision Making: Medium Complexity  Patient Education: safety, staff A with mobility  Barriers to Learning: dementia  REQUIRES PT FOLLOW UP: Yes  Activity Tolerance  Activity Tolerance: Patient limited by cognitive status      Discharge Recommendations:  Continue to assess pending progress, Patient would benefit from continued therapy after discharge      Plan   Plan  Times per week: at least 5-7 days a week  Times per day: Daily  Plan weeks: 2  Current Treatment Recommendations: Strengthening, ROM, Balance Training, Functional Mobility Training, Transfer Training, Gait Training, Endurance Training, Safety Education & Training, Pain Management, Patient/Caregiver Education & Training, Equipment Evaluation, Education, & procurement, Positioning  Plan Comment: cont. PT per POC. Safety Devices  Type of devices:  (seated on toilet with intent to amb. further.)     G-Code  PT G-Codes  Functional Assessment Tool Used: sit to stand   Score: Min A  Functional Limitation: Mobility: Walking and moving around  Mobility: Walking and Moving Around Current Status (): At least 40 percent but less than 60 percent impaired, limited or restricted  Mobility: Walking and Moving Around Goal Status (): At least 1 percent but less than 20 percent impaired, limited or restricted  OutComes Score                                            AM-PAC Score              Goals  Short term goals  Time Frame for Short term goals: 2 wks  Short term goal 1: supine to sit indep  Short term goal 2: sit to stand indep  Short term goal 3: amb. 200' with RW SBA  Patient Goals   Patient goals : get better        Therapy Time    Individual Concurrent Group Co-treatment   Time In           Time Out           Minutes                    Radha Pennington PT    Electronically signed by Radha Pennington PT on 12/17/2017 at 3:42 PM               Recent CBC/UA   WBC/Hgb/Hct/Plts:  4.3/10.9/32.7/202 (12/17 0917)  No results for input(s): LP1UAG in the last 72 hours. Last Imaging Result   Procedure Component Value Units Date/Time   CT Head WO Contrast [[id number]] Resulted: 12/16/17 2040   Order Status: Completed Updated: 12/16/17 1942   Narrative:     CT BRAIN without contrast 12/16/2017 6:00 PM  HISTORY: Fall. Closed head injury. COMPARISON: 10/11/2015   DLP: 939 mGy cm. Automated exposure control was utilized to diminish  patient radiation dose. TECHNIQUE: Serial axial tomographic images of the brain were obtained  without the use of intravenous contrast.   FINDINGS:   The midline structures are nondisplaced.  There is moderate cerebral  and cerebellar volume

## 2017-12-18 NOTE — DISCHARGE SUMMARY
Hospitalist  Discharge Summary    Patient ID: Madelyn Douglas      Patient's PCP: Terrie Up MD    Admit Date: 12/16/2017     Discharge Date:   12/18/2017    Admitting Physician: Avila Garcia MD     Discharge Physician: Brittny Hernandez DO     Discharge Diagnoses:  Principal Problem:    Fall  Active Problems:    Uncontrolled type 2 diabetes mellitus with complication ([facility]    Dementia without behavioral disturbance    Syncope and collapse       Active Hospital Problems    Diagnosis Date Noted    Fall [W19. XXXA] 12/17/2017    Syncope and collapse [R55] 12/16/2017    Dementia without behavioral disturbance [F03.90] 04/27/2016    Uncontrolled type 2 diabetes mellitus with complication ([address] [C23.3, E11.65] 04/24/2012       The patient was seen and examined on day of discharge and this discharge summary is in conjunction with any daily progress note from day of discharge. Hospital Course: Ms Joseline Carter is a 74 y/o female w/ hx of dementia who presented to [place] [facility] s/p twisting her ankle and falling. No fractures sustained. No infections or lab abnormalities on arrival. She was admitted to r/o any possible syncope or syncopal causes given her poor memory of event. She was fine on tele w/o arrhythmias. 2d echo w/ preserved ef and some diastolic dysfunction w/o any gross valvular abnormalities. Pt was seen by PT who recommended ongoing therapy. Pt was initially set for d/c home w/ [facility] but family asked about SNF placement. Pt was accepted to a facility and family is aware this is an out of pocket expense as insurance will not pay for an observation admission to rehab. They are agreeable to SNF anyway. Will d/c to SNF today.        Exam:   /69   Pulse 91   Temp 97.7 °F (36.5 °C) (Temporal)   Resp 20   Ht 5' 2\" (1.575 m)   Wt 133 lb 6 oz (60.5 kg)   SpO2 93%   BMI 24.39 kg/m²   General appearance: NAD, alert and cooperative with exam  HEENT: atraumatic, PERRLA, EOMI, anicteric, trachea segments noted.   No evidence of left ventricular mass or thrombus noted.   Normal right ventricular size with preserved RV function.   Right ventricular systolic pressure is noted at 29 mmHg.      Signature      ----------------------------------------------------------------   Electronically signed by Ryan Joseph MD(Interpreting   physician) on 12/17/2017 10:38 PM   ----------------------------------------------------------------      Findings      Mitral Valve   Structurally normal.   Normal mitral valve leaflet mobility.   Mild mitral regurgitation is present.      Aortic Valve   Aortic valve appears to be tricuspid.   Mild aortic valve sclerosis with normal cusp separation.   No significant aortic regurgitation or stenosis is noted.      Tricuspid Valve   Tricuspid valve is structurally normal.   Mild tricuspid regurgitation noted.      Pulmonic Valve   The pulmonic valve was not well visualized.      Left Atrium   Mildly dilated left atrium.      Left Ventricle   Normal left ventricular size with preserved LV function and an estimated   ejection fraction of approximately 50%.    Pseudonormal diastolic pattern consistent with Grade II diastolic   dysfunction.   Mild concentric left ventricular hypertrophy noted.   Hypokinesis of the apical lateral wall segments noted.   No evidence of left ventricular mass or thrombus noted.      Right Atrium   Normal right atrial dimension with no evidence of thrombus or mass noted.      Right Ventricle   Normal right ventricular size with preserved RV function.   Right ventricular systolic pressure is noted at 29 mmHg.      Pericardial Effusion   No evidence of significant pericardial effusion is noted.      Pleural Effusion   No evidence of pleural effusion.      Miscellaneous   Aortic root is within normal limits.     M-Mode Measurements (cm)      LVIDd: 4.1 cm                        LVIDs: 3.03 cm   IVSd: 1.11 cm   LVPWd: 1.16 cm                       AO Root Dimension: SUPPER)  Qty: 60 tablet, Refills: 5      citalopram (CELEXA) 10 MG tablet TAKE ONE TABLET BY MOUTH EACH EVENING AT BEDTIME. Qty: 30 tablet, Refills: 5      QUEtiapine (SEROQUEL) 50 MG tablet TAKE (2) TABLETS IN THE EVENING  Qty: 60 tablet, Refills: 5      metoclopramide (REGLAN) 10 MG tablet TAKE (1) TABLET FOUR TIMES DAILY  Qty: 120 tablet, Refills: 5      melatonin 3 MG TABS tablet Take 3 mg by mouth daily      Acetaminophen (TYLENOL 8 HOUR PO) Take 325 mg by mouth       aspirin 81 MG tablet Take 81 mg by mouth daily.            Current Facility-Administered Medications   Medication Dose Route Frequency Provider Last Rate Last Dose    glucose (GLUTOSE) 40 % oral gel 15 g  15 g Oral PRN Brad Cain MD        dextrose 50 % solution 12.5 g  12.5 g Intravenous PRN Jb Degroot MD        glucagon (rDNA) injection 1 mg  1 mg Intramuscular PRN Brad Cain MD        dextrose 5 % solution  100 mL/hr Intravenous PRN Brad Cain MD        insulin lispro (HUMALOG) injection vial 0-12 Units  0-12 Units Subcutaneous TID WC Brad Cain MD   4 Units at 12/18/17 1303    insulin lispro (HUMALOG) injection vial 0-6 Units  0-6 Units Subcutaneous Nightly Brad Cain MD        aspirin EC tablet 81 mg  81 mg Oral Daily Brad Cain MD   81 mg at 12/18/17 0806    atorvastatin (LIPITOR) tablet 40 mg  40 mg Oral Nightly Brad Cain MD   40 mg at 12/17/17 2007    clopidogrel (PLAVIX) tablet 75 mg  75 mg Oral Daily Brad Cain MD   75 mg at 12/18/17 0806    donepezil (ARICEPT) tablet 10 mg  10 mg Oral Nightly Brad Cain MD   10 mg at 12/17/17 2007    melatonin tablet 3 mg  3 mg Oral Nightly Brad Cain MD   3 mg at 12/17/17 2007    insulin glargine (LANTUS) injection vial 20 Units  20 Units Subcutaneous Nightly Jb Degroot MD   20 Units at 12/17/17 0401    0.9 % sodium chloride infusion   Intravenous Continuous Zeferino Ashley Cain MD 75 mL/hr at 12/18/17 3110      acetaminophen (TYLENOL) tablet 650 mg  650 mg Oral Q4H PRN Skye Oquendo MD   650 mg at 12/17/17 0740    docusate sodium (COLACE) capsule 100 mg  100 mg Oral BID Elisha Cain MD   100 mg at 12/18/17 0959    bisacodyl (DULCOLAX) suppository 10 mg  10 mg Rectal Daily PRN Skye Oquendo MD        ondansetron (ZOFRAN) injection 4 mg  4 mg Intravenous Q6H PRN Brad Cain MD        enoxaparin (LOVENOX) injection 40 mg  40 mg Subcutaneous Daily Gary Cain MD   40 mg at 12/18/17 3816       Time Spent on discharge is more than 35 minutes in the examination, evaluation, counseling and review of medications and discharge plan. Melita Chin D.O. Internal Medicine Hospitalist    Thank you Chantale García MD for the opportunity to be involved in this patient's care.  If you have any questions or concerns please feel free to contact me at [phone]

## 2017-12-18 NOTE — CARE COORDINATION
SW spoke with pt's sister in pt's room. SW informed that facilities had accepted pt for placement, however it would be private pay because the pt met as obs. Pt's sister stated again that pt could not afford private pay. SW asked if pt would be interested in [facility]. and pt's sister said yes but would prefer SNF. RIC called [employer] and left a voicemail to see if she could speak with pt and pt's sister about Medicaid. RIC will continue to follow.

## 2017-12-18 NOTE — PLAN OF CARE
Problem: Nutrition  Goal: Optimal nutrition therapy  Nutrition Problem: Inadequate oral intake  Intervention: Food and/or Nutrient Delivery: Modify current diet  Nutritional Goals: po intake of 75% or more and accu check below 150.    Outcome: Ongoing

## 2017-12-21 ENCOUNTER — LAB REQUISITION (OUTPATIENT)
Dept: LAB | Facility: HOSPITAL | Age: 67
End: 2017-12-21

## 2017-12-21 DIAGNOSIS — Z00.00 ENCOUNTER FOR GENERAL ADULT MEDICAL EXAMINATION WITHOUT ABNORMAL FINDINGS: ICD-10-CM

## 2017-12-21 LAB
ANION GAP SERPL CALCULATED.3IONS-SCNC: 14 MMOL/L (ref 4–13)
BUN BLD-MCNC: 17 MG/DL (ref 5–21)
BUN/CREAT SERPL: 25.4 (ref 7–25)
CALCIUM SPEC-SCNC: 9.3 MG/DL (ref 8.4–10.4)
CHLORIDE SERPL-SCNC: 105 MMOL/L (ref 98–110)
CO2 SERPL-SCNC: 24 MMOL/L (ref 24–31)
CREAT BLD-MCNC: 0.67 MG/DL (ref 0.5–1.4)
GFR SERPL CREATININE-BSD FRML MDRD: 106 ML/MIN/1.73
GFR SERPL CREATININE-BSD FRML MDRD: 88 ML/MIN/1.73
GLUCOSE BLD-MCNC: 147 MG/DL (ref 70–100)
POTASSIUM BLD-SCNC: 3.8 MMOL/L (ref 3.5–5.3)
SODIUM BLD-SCNC: 143 MMOL/L (ref 135–145)

## 2017-12-21 PROCEDURE — 80048 BASIC METABOLIC PNL TOTAL CA: CPT | Performed by: NURSE PRACTITIONER

## 2017-12-21 PROCEDURE — 36415 COLL VENOUS BLD VENIPUNCTURE: CPT | Performed by: NURSE PRACTITIONER

## 2017-12-27 ENCOUNTER — LAB REQUISITION (OUTPATIENT)
Dept: LAB | Facility: HOSPITAL | Age: 67
End: 2017-12-27

## 2017-12-27 DIAGNOSIS — Z00.00 ENCOUNTER FOR GENERAL ADULT MEDICAL EXAMINATION WITHOUT ABNORMAL FINDINGS: ICD-10-CM

## 2017-12-27 LAB
ALBUMIN SERPL-MCNC: 3.9 G/DL (ref 3.5–5)
ALP SERPL-CCNC: 58 U/L (ref 24–120)
ALT SERPL W P-5'-P-CCNC: 31 U/L (ref 0–54)
ANION GAP SERPL CALCULATED.3IONS-SCNC: 13 MMOL/L (ref 4–13)
ARTICHOKE IGE QN: 71 MG/DL (ref 0–99)
AST SERPL-CCNC: 22 U/L (ref 7–45)
BILIRUB CONJ SERPL-MCNC: 0 MG/DL (ref 0–0.3)
BILIRUB INDIRECT SERPL-MCNC: 0 MG/DL (ref 0–1.1)
BILIRUB SERPL-MCNC: 0.2 MG/DL (ref 0.1–1)
BUN BLD-MCNC: 16 MG/DL (ref 5–21)
BUN/CREAT SERPL: 23.9 (ref 7–25)
CALCIUM SPEC-SCNC: 9.4 MG/DL (ref 8.4–10.4)
CHLORIDE SERPL-SCNC: 101 MMOL/L (ref 98–110)
CHOLEST SERPL-MCNC: 136 MG/DL (ref 130–200)
CO2 SERPL-SCNC: 25 MMOL/L (ref 24–31)
CREAT BLD-MCNC: 0.67 MG/DL (ref 0.5–1.4)
DEPRECATED RDW RBC AUTO: 44.4 FL (ref 40–54)
ERYTHROCYTE [DISTWIDTH] IN BLOOD BY AUTOMATED COUNT: 13.7 % (ref 12–15)
GFR SERPL CREATININE-BSD FRML MDRD: 88 ML/MIN/1.73
GLUCOSE BLD-MCNC: 147 MG/DL (ref 70–100)
HBA1C MFR BLD: 7.3 %
HCT VFR BLD AUTO: 37.3 % (ref 37–47)
HDLC SERPL-MCNC: 63 MG/DL
HGB BLD-MCNC: 12.4 G/DL (ref 12–16)
LDLC/HDLC SERPL: 0.69 {RATIO}
MCH RBC QN AUTO: 29.4 PG (ref 28–32)
MCHC RBC AUTO-ENTMCNC: 33.2 G/DL (ref 33–36)
MCV RBC AUTO: 88.4 FL (ref 82–98)
PLATELET # BLD AUTO: 308 10*3/MM3 (ref 130–400)
PMV BLD AUTO: 9.6 FL (ref 6–12)
POTASSIUM BLD-SCNC: 4 MMOL/L (ref 3.5–5.3)
PREALB SERPL-MCNC: 25.6 MG/DL (ref 18–36)
PROT SERPL-MCNC: 6.7 G/DL (ref 6.3–8.7)
RBC # BLD AUTO: 4.22 10*6/MM3 (ref 4.2–5.4)
SODIUM BLD-SCNC: 139 MMOL/L (ref 135–145)
TRIGL SERPL-MCNC: 147 MG/DL (ref 0–149)
TSH SERPL DL<=0.05 MIU/L-ACNC: 1.98 MIU/ML (ref 0.47–4.68)
VIT B12 BLD-MCNC: 703 PG/ML (ref 239–931)
WBC NRBC COR # BLD: 5.05 10*3/MM3 (ref 4.8–10.8)

## 2017-12-27 PROCEDURE — 80076 HEPATIC FUNCTION PANEL: CPT | Performed by: NURSE PRACTITIONER

## 2017-12-27 PROCEDURE — 84134 ASSAY OF PREALBUMIN: CPT | Performed by: NURSE PRACTITIONER

## 2017-12-27 PROCEDURE — 36415 COLL VENOUS BLD VENIPUNCTURE: CPT | Performed by: NURSE PRACTITIONER

## 2017-12-27 PROCEDURE — 80061 LIPID PANEL: CPT | Performed by: NURSE PRACTITIONER

## 2017-12-27 PROCEDURE — 82607 VITAMIN B-12: CPT | Performed by: NURSE PRACTITIONER

## 2017-12-27 PROCEDURE — 84443 ASSAY THYROID STIM HORMONE: CPT | Performed by: NURSE PRACTITIONER

## 2017-12-27 PROCEDURE — 80048 BASIC METABOLIC PNL TOTAL CA: CPT | Performed by: NURSE PRACTITIONER

## 2017-12-27 PROCEDURE — 85027 COMPLETE CBC AUTOMATED: CPT | Performed by: NURSE PRACTITIONER

## 2017-12-27 PROCEDURE — 83036 HEMOGLOBIN GLYCOSYLATED A1C: CPT | Performed by: NURSE PRACTITIONER

## 2017-12-29 ENCOUNTER — LAB REQUISITION (OUTPATIENT)
Dept: LAB | Facility: HOSPITAL | Age: 67
End: 2017-12-29

## 2017-12-29 DIAGNOSIS — Z00.00 ENCOUNTER FOR GENERAL ADULT MEDICAL EXAMINATION WITHOUT ABNORMAL FINDINGS: ICD-10-CM

## 2017-12-29 LAB
FLUAV AG NPH QL: POSITIVE
FLUBV AG NPH QL IA: NEGATIVE

## 2017-12-29 PROCEDURE — 87804 INFLUENZA ASSAY W/OPTIC: CPT | Performed by: NURSE PRACTITIONER

## 2017-12-30 ENCOUNTER — LAB REQUISITION (OUTPATIENT)
Dept: LAB | Facility: HOSPITAL | Age: 67
End: 2017-12-30

## 2017-12-30 DIAGNOSIS — Z00.00 ENCOUNTER FOR GENERAL ADULT MEDICAL EXAMINATION WITHOUT ABNORMAL FINDINGS: ICD-10-CM

## 2017-12-30 LAB — C DIFF TOX GENS STL QL NAA+PROBE: NEGATIVE

## 2017-12-30 PROCEDURE — 87493 C DIFF AMPLIFIED PROBE: CPT | Performed by: NURSE PRACTITIONER

## 2018-01-01 ENCOUNTER — HOSPITAL ENCOUNTER (EMERGENCY)
Age: 68
Discharge: HOME | End: 2018-07-16
Attending: EMERGENCY MEDICINE
Payer: MEDICARE

## 2018-01-01 ENCOUNTER — APPOINTMENT (OUTPATIENT)
Dept: CT IMAGING | Age: 68
End: 2018-01-01
Payer: MEDICARE

## 2018-01-01 ENCOUNTER — APPOINTMENT (OUTPATIENT)
Dept: CT IMAGING | Age: 68
DRG: 871 | End: 2018-01-01
Payer: MEDICARE

## 2018-01-01 ENCOUNTER — CARE COORDINATION (OUTPATIENT)
Dept: CARE COORDINATION | Age: 68
End: 2018-01-01

## 2018-01-01 ENCOUNTER — TELEPHONE (OUTPATIENT)
Dept: PRIMARY CARE CLINIC | Age: 68
End: 2018-01-01

## 2018-01-01 ENCOUNTER — HOSPITAL ENCOUNTER (INPATIENT)
Age: 68
LOS: 1 days | Discharge: HOSPICE | DRG: 871 | End: 2018-12-07
Attending: EMERGENCY MEDICINE | Admitting: INTERNAL MEDICINE
Payer: MEDICARE

## 2018-01-01 ENCOUNTER — HOSPITAL ENCOUNTER (EMERGENCY)
Age: 68
Discharge: HOME | End: 2018-10-29
Attending: EMERGENCY MEDICINE
Payer: MEDICARE

## 2018-01-01 ENCOUNTER — HOSPITAL ENCOUNTER (OUTPATIENT)
Dept: ULTRASOUND IMAGING | Age: 68
Discharge: HOME | End: 2018-04-23
Payer: MEDICARE

## 2018-01-01 ENCOUNTER — HOSPITAL ENCOUNTER (EMERGENCY)
Age: 68
Discharge: HOME | End: 2018-04-29
Attending: EMERGENCY MEDICINE
Payer: MEDICARE

## 2018-01-01 ENCOUNTER — APPOINTMENT (OUTPATIENT)
Dept: GENERAL RADIOLOGY | Age: 68
DRG: 871 | End: 2018-01-01
Payer: MEDICARE

## 2018-01-01 ENCOUNTER — HOSPITAL ENCOUNTER (EMERGENCY)
Age: 68
Discharge: HOME | DRG: 871 | End: 2018-12-05
Attending: EMERGENCY MEDICINE
Payer: MEDICARE

## 2018-01-01 ENCOUNTER — APPOINTMENT (OUTPATIENT)
Dept: MRI IMAGING | Age: 68
DRG: 871 | End: 2018-01-01
Payer: MEDICARE

## 2018-01-01 ENCOUNTER — LAB REQUISITION (OUTPATIENT)
Dept: LAB | Facility: HOSPITAL | Age: 68
End: 2018-01-01

## 2018-01-01 VITALS
BODY MASS INDEX: 18.45 KG/M2 | TEMPERATURE: 97.5 F | SYSTOLIC BLOOD PRESSURE: 112 MMHG | RESPIRATION RATE: 18 BRPM | DIASTOLIC BLOOD PRESSURE: 69 MMHG | WEIGHT: 104.13 LBS | OXYGEN SATURATION: 99 % | HEART RATE: 103 BPM | HEIGHT: 63 IN

## 2018-01-01 VITALS
HEART RATE: 100 BPM | TEMPERATURE: 99.1 F | OXYGEN SATURATION: 95 % | DIASTOLIC BLOOD PRESSURE: 84 MMHG | SYSTOLIC BLOOD PRESSURE: 143 MMHG | RESPIRATION RATE: 16 BRPM

## 2018-01-01 VITALS
WEIGHT: 120 LBS | HEIGHT: 62 IN | DIASTOLIC BLOOD PRESSURE: 59 MMHG | OXYGEN SATURATION: 94 % | RESPIRATION RATE: 14 BRPM | HEART RATE: 80 BPM | SYSTOLIC BLOOD PRESSURE: 113 MMHG | BODY MASS INDEX: 22.08 KG/M2 | TEMPERATURE: 98.3 F

## 2018-01-01 VITALS
BODY MASS INDEX: 23.92 KG/M2 | TEMPERATURE: 98 F | WEIGHT: 130 LBS | DIASTOLIC BLOOD PRESSURE: 74 MMHG | HEART RATE: 88 BPM | RESPIRATION RATE: 18 BRPM | HEIGHT: 62 IN | OXYGEN SATURATION: 96 % | SYSTOLIC BLOOD PRESSURE: 110 MMHG

## 2018-01-01 VITALS
OXYGEN SATURATION: 95 % | RESPIRATION RATE: 18 BRPM | HEIGHT: 63 IN | HEART RATE: 88 BPM | WEIGHT: 120 LBS | SYSTOLIC BLOOD PRESSURE: 108 MMHG | TEMPERATURE: 98.1 F | DIASTOLIC BLOOD PRESSURE: 78 MMHG | BODY MASS INDEX: 21.26 KG/M2

## 2018-01-01 DIAGNOSIS — R51.9 NONINTRACTABLE HEADACHE, UNSPECIFIED CHRONICITY PATTERN, UNSPECIFIED HEADACHE TYPE: Primary | ICD-10-CM

## 2018-01-01 DIAGNOSIS — N63.0 BREAST NODULE: ICD-10-CM

## 2018-01-01 DIAGNOSIS — Z00.00 ENCOUNTER FOR GENERAL ADULT MEDICAL EXAMINATION WITHOUT ABNORMAL FINDINGS: ICD-10-CM
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
## 2018-01-01 DIAGNOSIS — S00.83XA TRAUMATIC HEMATOMA OF FOREHEAD, INITIAL ENCOUNTER: ICD-10-CM

## 2018-01-01 DIAGNOSIS — N30.00 ACUTE CYSTITIS WITHOUT HEMATURIA: Primary | ICD-10-CM

## 2018-01-01 DIAGNOSIS — S01.81XA SUPERFICIAL LACERATION OF FACE: ICD-10-CM

## 2018-01-01 DIAGNOSIS — S09.90XA INJURY OF HEAD, INITIAL ENCOUNTER: ICD-10-CM
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
## 2018-01-01 DIAGNOSIS — S01.01XA LACERATION OF SCALP, INITIAL ENCOUNTER: Primary | ICD-10-CM

## 2018-01-01 DIAGNOSIS — E87.0 HYPERNATREMIA: ICD-10-CM

## 2018-01-01 LAB
ALBUMIN SERPL-MCNC: 3 G/DL (ref 3.5–5.2)
ALBUMIN SERPL-MCNC: 3.5 G/DL (ref 3.5–5.2)
ALBUMIN SERPL-MCNC: 3.7 G/DL (ref 3.5–5)
ALBUMIN SERPL-MCNC: 4 G/DL (ref 3.5–5.2)
ALBUMIN/GLOB SERPL: 1.3 G/DL (ref 1.1–2.5)
ALP BLD-CCNC: 48 U/L (ref 35–104)
ALP BLD-CCNC: 51 U/L (ref 35–104)
ALP BLD-CCNC: 60 U/L (ref 35–104)
ALP SERPL-CCNC: 63 U/L (ref 24–120)
ALT SERPL W P-5'-P-CCNC: 23 U/L (ref 0–54)
ALT SERPL-CCNC: 6 U/L (ref 5–33)
ALT SERPL-CCNC: 6 U/L (ref 5–33)
ALT SERPL-CCNC: <5 U/L (ref 5–33)
ANION GAP SERPL CALCULATED.3IONS-SCNC: 11 MMOL/L (ref 7–19)
ANION GAP SERPL CALCULATED.3IONS-SCNC: 13 MMOL/L (ref 4–13)
ANION GAP SERPL CALCULATED.3IONS-SCNC: 15 MMOL/L (ref 7–19)
ANION GAP SERPL CALCULATED.3IONS-SCNC: 15 MMOL/L (ref 7–19)
ANION GAP SERPL CALCULATED.3IONS-SCNC: 8 MMOL/L (ref 7–19)
APTT: 30.9 SEC (ref 26–36.2)
AST SERPL-CCNC: 14 U/L (ref 5–32)
AST SERPL-CCNC: 20 U/L (ref 5–32)
AST SERPL-CCNC: 23 U/L (ref 7–45)
AST SERPL-CCNC: 27 U/L (ref 5–32)
BACTERIA: ABNORMAL /HPF
BACTERIA: NEGATIVE /HPF
BACTERIA: NEGATIVE /HPF
BASE EXCESS ARTERIAL: 5.8 MMOL/L (ref -2–2)
BASOPHILS ABSOLUTE: 0 K/UL (ref 0–0.2)
BASOPHILS ABSOLUTE: 0.1 K/UL (ref 0–0.2)
BASOPHILS RELATIVE PERCENT: 0.4 % (ref 0–1)
BASOPHILS RELATIVE PERCENT: 0.6 % (ref 0–1)
BASOPHILS RELATIVE PERCENT: 0.6 % (ref 0–1)
BASOPHILS RELATIVE PERCENT: 0.7 % (ref 0–1)
BILIRUB SERPL-MCNC: 0.2 MG/DL (ref 0.1–1)
BILIRUB SERPL-MCNC: 0.3 MG/DL (ref 0.2–1.2)
BILIRUB SERPL-MCNC: <0.2 MG/DL (ref 0.2–1.2)
BILIRUB SERPL-MCNC: <0.2 MG/DL (ref 0.2–1.2)
BILIRUBIN URINE: NEGATIVE
BLOOD, URINE: NEGATIVE
BUN BLD-MCNC: 16 MG/DL (ref 5–21)
BUN BLDV-MCNC: 18 MG/DL (ref 8–23)
BUN BLDV-MCNC: 40 MG/DL (ref 8–23)
BUN BLDV-MCNC: 49 MG/DL (ref 8–23)
BUN BLDV-MCNC: 56 MG/DL (ref 8–23)
BUN/CREAT SERPL: 24.2 (ref 7–25)
CALCIUM SERPL-MCNC: 8.5 MG/DL (ref 8.8–10.2)
CALCIUM SERPL-MCNC: 8.8 MG/DL (ref 8.8–10.2)
CALCIUM SERPL-MCNC: 9.2 MG/DL (ref 8.8–10.2)
CALCIUM SERPL-MCNC: 9.6 MG/DL (ref 8.8–10.2)
CALCIUM SPEC-SCNC: 9.1 MG/DL (ref 8.4–10.4)
CARBOXYHEMOGLOBIN ARTERIAL: 1.8 % (ref 0–5)
CHLORIDE BLD-SCNC: 111 MMOL/L (ref 98–111)
CHLORIDE BLD-SCNC: 115 MMOL/L (ref 98–111)
CHLORIDE BLD-SCNC: 118 MMOL/L (ref 98–111)
CHLORIDE BLD-SCNC: 98 MMOL/L (ref 98–111)
CHLORIDE SERPL-SCNC: 98 MMOL/L (ref 98–110)
CHP ED QC CHECK: NORMAL
CLARITY: ABNORMAL
CLARITY: CLEAR
CLARITY: CLEAR
CO2 SERPL-SCNC: 28 MMOL/L (ref 24–31)
CO2: 26 MMOL/L (ref 22–29)
CO2: 26 MMOL/L (ref 22–29)
CO2: 30 MMOL/L (ref 22–29)
CO2: 30 MMOL/L (ref 22–29)
COLOR: YELLOW
CREAT BLD-MCNC: 0.66 MG/DL (ref 0.5–1.4)
CREAT SERPL-MCNC: 0.5 MG/DL (ref 0.5–0.9)
CREAT SERPL-MCNC: 0.8 MG/DL (ref 0.5–0.9)
CREAT SERPL-MCNC: 0.9 MG/DL (ref 0.5–0.9)
CREAT SERPL-MCNC: 1.2 MG/DL (ref 0.5–0.9)
DEPRECATED RDW RBC AUTO: 44.4 FL (ref 40–54)
EKG P AXIS: 38 DEGREES
EKG P AXIS: 47 DEGREES
EKG P AXIS: 48 DEGREES
EKG P-R INTERVAL: 122 MS
EKG P-R INTERVAL: 128 MS
EKG P-R INTERVAL: 142 MS
EKG Q-T INTERVAL: 300 MS
EKG Q-T INTERVAL: 306 MS
EKG Q-T INTERVAL: 338 MS
EKG QRS DURATION: 62 MS
EKG QRS DURATION: 68 MS
EKG QRS DURATION: 72 MS
EKG QTC CALCULATION (BAZETT): 387 MS
EKG QTC CALCULATION (BAZETT): 402 MS
EKG QTC CALCULATION (BAZETT): 404 MS
EKG T AXIS: 147 DEGREES
EKG T AXIS: 155 DEGREES
EKG T AXIS: 44 DEGREES
EOSINOPHILS ABSOLUTE: 0 K/UL (ref 0–0.6)
EOSINOPHILS ABSOLUTE: 0.1 K/UL (ref 0–0.6)
EOSINOPHILS RELATIVE PERCENT: 0 % (ref 0–5)
EOSINOPHILS RELATIVE PERCENT: 1.7 % (ref 0–5)
EPITHELIAL CELLS, UA: 1 /HPF (ref 0–5)
EPITHELIAL CELLS, UA: 3 /HPF (ref 0–5)
EPITHELIAL CELLS, UA: 3 /HPF (ref 0–5)
ERYTHROCYTE [DISTWIDTH] IN BLOOD BY AUTOMATED COUNT: 13.8 % (ref 12–15)
GFR NON-AFRICAN AMERICAN: 45
GFR NON-AFRICAN AMERICAN: >60
GFR SERPL CREATININE-BSD FRML MDRD: 89 ML/MIN/1.73
GLOBULIN UR ELPH-MCNC: 2.8 GM/DL
GLUCOSE BLD-MCNC: 128 MG/DL
GLUCOSE BLD-MCNC: 128 MG/DL (ref 70–99)
GLUCOSE BLD-MCNC: 145 MG/DL (ref 70–99)
GLUCOSE BLD-MCNC: 152 MG/DL (ref 70–99)
GLUCOSE BLD-MCNC: 159 MG/DL (ref 70–99)
GLUCOSE BLD-MCNC: 166 MG/DL (ref 74–109)
GLUCOSE BLD-MCNC: 167 MG/DL (ref 74–109)
GLUCOSE BLD-MCNC: 178 MG/DL (ref 70–99)
GLUCOSE BLD-MCNC: 178 MG/DL (ref 70–99)
GLUCOSE BLD-MCNC: 179 MG/DL (ref 74–109)
GLUCOSE BLD-MCNC: 197 MG/DL (ref 74–109)
GLUCOSE BLD-MCNC: 198 MG/DL (ref 70–100)
GLUCOSE BLD-MCNC: 210 MG/DL (ref 70–99)
GLUCOSE BLD-MCNC: 215 MG/DL (ref 70–99)
GLUCOSE URINE: NEGATIVE MG/DL
HBA1C MFR BLD: 6 % (ref 4–6)
HCO3 ARTERIAL: 30.6 MMOL/L (ref 22–26)
HCT VFR BLD AUTO: 38.2 % (ref 37–47)
HCT VFR BLD CALC: 36.3 % (ref 37–47)
HCT VFR BLD CALC: 41.6 % (ref 37–47)
HCT VFR BLD CALC: 44.1 % (ref 37–47)
HCT VFR BLD CALC: 46.9 % (ref 37–47)
HEMOGLOBIN, ART, EXTENDED: 14.5 G/DL (ref 12–16)
HEMOGLOBIN: 11.7 G/DL (ref 12–16)
HEMOGLOBIN: 12.6 G/DL (ref 12–16)
HEMOGLOBIN: 14.2 G/DL (ref 12–16)
HEMOGLOBIN: 14.4 G/DL (ref 12–16)
HGB BLD-MCNC: 12.6 G/DL (ref 12–16)
HYALINE CASTS: 1 /HPF (ref 0–8)
HYALINE CASTS: 10 /HPF (ref 0–8)
HYALINE CASTS: 7 /HPF (ref 0–8)
INR BLD: 1 (ref 0.88–1.18)
KETONES, URINE: 15 MG/DL
LACTIC ACID, SEPSIS: 1.3 MG/DL (ref 0.5–1.9)
LACTIC ACID, SEPSIS: 1.4 MG/DL (ref 0.5–1.9)
LACTIC ACID: 1.7 MMOL/L (ref 0.5–1.9)
LACTIC ACID: 1.8 MMOL/L (ref 0.5–1.9)
LEUKOCYTE ESTERASE, URINE: ABNORMAL
LEUKOCYTE ESTERASE, URINE: NEGATIVE
LEUKOCYTE ESTERASE, URINE: NEGATIVE
LYMPHOCYTES ABSOLUTE: 1 K/UL (ref 1.1–4.5)
LYMPHOCYTES ABSOLUTE: 1.5 K/UL (ref 1.1–4.5)
LYMPHOCYTES ABSOLUTE: 1.9 K/UL (ref 1.1–4.5)
LYMPHOCYTES ABSOLUTE: 2.1 K/UL (ref 1.1–4.5)
LYMPHOCYTES RELATIVE PERCENT: 20.7 % (ref 20–40)
LYMPHOCYTES RELATIVE PERCENT: 21.2 % (ref 20–40)
LYMPHOCYTES RELATIVE PERCENT: 29 % (ref 20–40)
LYMPHOCYTES RELATIVE PERCENT: 30.2 % (ref 20–40)
MCH RBC QN AUTO: 28.7 PG (ref 27–31)
MCH RBC QN AUTO: 28.8 PG (ref 27–31)
MCH RBC QN AUTO: 28.9 PG (ref 28–32)
MCH RBC QN AUTO: 29 PG (ref 27–31)
MCH RBC QN AUTO: 29 PG (ref 27–31)
MCHC RBC AUTO-ENTMCNC: 30.3 G/DL (ref 33–37)
MCHC RBC AUTO-ENTMCNC: 30.7 G/DL (ref 33–37)
MCHC RBC AUTO-ENTMCNC: 32.2 G/DL (ref 33–37)
MCHC RBC AUTO-ENTMCNC: 32.2 G/DL (ref 33–37)
MCHC RBC AUTO-ENTMCNC: 33 G/DL (ref 33–36)
MCV RBC AUTO: 87.6 FL (ref 82–98)
MCV RBC AUTO: 89.2 FL (ref 81–99)
MCV RBC AUTO: 90.2 FL (ref 81–99)
MCV RBC AUTO: 93.8 FL (ref 81–99)
MCV RBC AUTO: 95.9 FL (ref 81–99)
METHEMOGLOBIN ARTERIAL: 1.3 %
MONOCYTES ABSOLUTE: 0.3 K/UL (ref 0–0.9)
MONOCYTES ABSOLUTE: 0.6 K/UL (ref 0–0.9)
MONOCYTES ABSOLUTE: 0.6 K/UL (ref 0–0.9)
MONOCYTES ABSOLUTE: 0.9 K/UL (ref 0–0.9)
MONOCYTES RELATIVE PERCENT: 14 % (ref 0–10)
MONOCYTES RELATIVE PERCENT: 5.8 % (ref 0–10)
MONOCYTES RELATIVE PERCENT: 8.8 % (ref 0–10)
MONOCYTES RELATIVE PERCENT: 9.2 % (ref 0–10)
NEUTROPHILS ABSOLUTE: 3.4 K/UL (ref 1.5–7.5)
NEUTROPHILS ABSOLUTE: 3.7 K/UL (ref 1.5–7.5)
NEUTROPHILS ABSOLUTE: 4.1 K/UL (ref 1.5–7.5)
NEUTROPHILS ABSOLUTE: 4.8 K/UL (ref 1.5–7.5)
NEUTROPHILS RELATIVE PERCENT: 56.1 % (ref 50–65)
NEUTROPHILS RELATIVE PERCENT: 58.5 % (ref 50–65)
NEUTROPHILS RELATIVE PERCENT: 69.1 % (ref 50–65)
NEUTROPHILS RELATIVE PERCENT: 72.9 % (ref 50–65)
NITRITE, URINE: NEGATIVE
NITRITE, URINE: NEGATIVE
NITRITE, URINE: POSITIVE
O2 CONTENT ARTERIAL: 18.2 ML/DL
O2 SAT, ARTERIAL: 89.6 %
O2 THERAPY: ABNORMAL
ORGANISM: ABNORMAL
PCO2 ARTERIAL: 44 MMHG (ref 35–45)
PDW BLD-RTO: 13.2 % (ref 11.5–14.5)
PDW BLD-RTO: 15.8 % (ref 11.5–14.5)
PDW BLD-RTO: 16 % (ref 11.5–14.5)
PDW BLD-RTO: 16.2 % (ref 11.5–14.5)
PERFORMED ON: ABNORMAL
PH ARTERIAL: 7.45 (ref 7.35–7.45)
PH UA: 5.5
PH UA: 6
PH UA: 6
PLATELET # BLD AUTO: 257 10*3/MM3 (ref 130–400)
PLATELET # BLD: 149 K/UL (ref 130–400)
PLATELET # BLD: 183 K/UL (ref 130–400)
PLATELET # BLD: 214 K/UL (ref 130–400)
PLATELET # BLD: 278 K/UL (ref 130–400)
PMV BLD AUTO: 9 FL (ref 9.4–12.3)
PMV BLD AUTO: 9.2 FL (ref 9.4–12.3)
PMV BLD AUTO: 9.6 FL (ref 9.4–12.3)
PMV BLD AUTO: 9.8 FL (ref 9.4–12.3)
PMV BLD AUTO: 9.9 FL (ref 6–12)
PO2 ARTERIAL: 58 MMHG (ref 80–100)
POTASSIUM BLD-SCNC: 4 MMOL/L (ref 3.5–5.3)
POTASSIUM REFLEX MAGNESIUM: 3.6 MMOL/L (ref 3.5–5)
POTASSIUM REFLEX MAGNESIUM: 3.8 MMOL/L (ref 3.5–5)
POTASSIUM REFLEX MAGNESIUM: 4.6 MMOL/L (ref 3.5–5)
POTASSIUM SERPL-SCNC: 4.2 MMOL/L (ref 3.5–5)
POTASSIUM, WHOLE BLOOD: 3.7
PROT SERPL-MCNC: 6.5 G/DL (ref 6.3–8.7)
PROTEIN UA: 30 MG/DL
PROTEIN UA: NEGATIVE MG/DL
PROTEIN UA: NEGATIVE MG/DL
PROTHROMBIN TIME: 13.1 SEC (ref 12–14.6)
RAPID INFLUENZA  B AGN: NEGATIVE
RAPID INFLUENZA A AGN: NEGATIVE
RBC # BLD AUTO: 4.36 10*6/MM3 (ref 4.2–5.4)
RBC # BLD: 4.07 M/UL (ref 4.2–5.4)
RBC # BLD: 4.34 M/UL (ref 4.2–5.4)
RBC # BLD: 4.89 M/UL (ref 4.2–5.4)
RBC # BLD: 5 M/UL (ref 4.2–5.4)
RBC UA: 1 /HPF (ref 0–4)
SODIUM BLD-SCNC: 139 MMOL/L (ref 135–145)
SODIUM BLD-SCNC: 139 MMOL/L (ref 136–145)
SODIUM BLD-SCNC: 152 MMOL/L (ref 136–145)
SODIUM BLD-SCNC: 152 MMOL/L (ref 136–145)
SODIUM BLD-SCNC: 154 MMOL/L (ref 136–145)
SODIUM BLD-SCNC: 155 MMOL/L (ref 136–145)
SODIUM BLD-SCNC: 155 MMOL/L (ref 136–145)
SODIUM BLD-SCNC: 156 MMOL/L (ref 136–145)
SODIUM BLD-SCNC: 157 MMOL/L (ref 136–145)
SPECIFIC GRAVITY UA: 1.02
SPECIFIC GRAVITY UA: 1.03
SPECIFIC GRAVITY UA: 1.03
TOTAL PROTEIN: 6.1 G/DL (ref 6.6–8.7)
TOTAL PROTEIN: 6.4 G/DL (ref 6.6–8.7)
TOTAL PROTEIN: 6.8 G/DL (ref 6.6–8.7)
TSH SERPL DL<=0.05 MIU/L-ACNC: 0.64 UIU/ML (ref 0.27–4.2)
URINE CULTURE, ROUTINE: ABNORMAL
URINE CULTURE, ROUTINE: ABNORMAL
URINE CULTURE, ROUTINE: NORMAL
URINE CULTURE, ROUTINE: NORMAL
URINE REFLEX TO CULTURE: YES
UROBILINOGEN, URINE: 0.2 E.U./DL
WBC # BLD: 4.6 K/UL (ref 4.8–10.8)
WBC # BLD: 6.7 K/UL (ref 4.8–10.8)
WBC # BLD: 7 K/UL (ref 4.8–10.8)
WBC # BLD: 7 K/UL (ref 4.8–10.8)
WBC NRBC COR # BLD: 5.06 10*3/MM3 (ref 4.8–10.8)
WBC UA: 3 /HPF (ref 0–5)
WBC UA: 4 /HPF (ref 0–5)
WBC UA: 6 /HPF (ref 0–5)

## 2018-01-01 PROCEDURE — 2140000000 HC CCU INTERMEDIATE R&B

## 2018-01-01 PROCEDURE — 2580000003 HC RX 258: Performed by: INTERNAL MEDICINE

## 2018-01-01 PROCEDURE — 99285 EMERGENCY DEPT VISIT HI MDM: CPT

## 2018-01-01 PROCEDURE — 99284 EMERGENCY DEPT VISIT MOD MDM: CPT | Performed by: EMERGENCY MEDICINE

## 2018-01-01 PROCEDURE — 2580000003 HC RX 258: Performed by: EMERGENCY MEDICINE

## 2018-01-01 PROCEDURE — 36415 COLL VENOUS BLD VENIPUNCTURE: CPT

## 2018-01-01 PROCEDURE — 82948 REAGENT STRIP/BLOOD GLUCOSE: CPT

## 2018-01-01 PROCEDURE — 84443 ASSAY THYROID STIM HORMONE: CPT

## 2018-01-01 PROCEDURE — 83605 ASSAY OF LACTIC ACID: CPT

## 2018-01-01 PROCEDURE — 71045 X-RAY EXAM CHEST 1 VIEW: CPT

## 2018-01-01 PROCEDURE — 99284 EMERGENCY DEPT VISIT MOD MDM: CPT

## 2018-01-01 PROCEDURE — 6370000000 HC RX 637 (ALT 250 FOR IP): Performed by: EMERGENCY MEDICINE

## 2018-01-01 PROCEDURE — 6360000002 HC RX W HCPCS: Performed by: EMERGENCY MEDICINE

## 2018-01-01 PROCEDURE — 96374 THER/PROPH/DIAG INJ IV PUSH: CPT

## 2018-01-01 PROCEDURE — 6360000002 HC RX W HCPCS: Performed by: INTERNAL MEDICINE

## 2018-01-01 PROCEDURE — 99239 HOSP IP/OBS DSCHRG MGMT >30: CPT | Performed by: INTERNAL MEDICINE

## 2018-01-01 PROCEDURE — 36600 WITHDRAWAL OF ARTERIAL BLOOD: CPT

## 2018-01-01 PROCEDURE — 85610 PROTHROMBIN TIME: CPT

## 2018-01-01 PROCEDURE — 81001 URINALYSIS AUTO W/SCOPE: CPT

## 2018-01-01 PROCEDURE — 87804 INFLUENZA ASSAY W/OPTIC: CPT

## 2018-01-01 PROCEDURE — 99285 EMERGENCY DEPT VISIT HI MDM: CPT | Performed by: EMERGENCY MEDICINE

## 2018-01-01 PROCEDURE — 70551 MRI BRAIN STEM W/O DYE: CPT

## 2018-01-01 PROCEDURE — 80053 COMPREHEN METABOLIC PANEL: CPT

## 2018-01-01 PROCEDURE — 93005 ELECTROCARDIOGRAM TRACING: CPT

## 2018-01-01 PROCEDURE — 87186 SC STD MICRODIL/AGAR DIL: CPT

## 2018-01-01 PROCEDURE — 87086 URINE CULTURE/COLONY COUNT: CPT

## 2018-01-01 PROCEDURE — 36415 COLL VENOUS BLD VENIPUNCTURE: CPT | Performed by: NURSE PRACTITIONER

## 2018-01-01 PROCEDURE — 72125 CT NECK SPINE W/O DYE: CPT

## 2018-01-01 PROCEDURE — 85025 COMPLETE CBC W/AUTO DIFF WBC: CPT

## 2018-01-01 PROCEDURE — 6370000000 HC RX 637 (ALT 250 FOR IP): Performed by: INTERNAL MEDICINE

## 2018-01-01 PROCEDURE — 96375 TX/PRO/DX INJ NEW DRUG ADDON: CPT

## 2018-01-01 PROCEDURE — 99283 EMERGENCY DEPT VISIT LOW MDM: CPT | Performed by: EMERGENCY MEDICINE

## 2018-01-01 PROCEDURE — 87040 BLOOD CULTURE FOR BACTERIA: CPT

## 2018-01-01 PROCEDURE — 96365 THER/PROPH/DIAG IV INF INIT: CPT

## 2018-01-01 PROCEDURE — 70450 CT HEAD/BRAIN W/O DYE: CPT

## 2018-01-01 PROCEDURE — 2700000000 HC OXYGEN THERAPY PER DAY

## 2018-01-01 PROCEDURE — 82803 BLOOD GASES ANY COMBINATION: CPT

## 2018-01-01 PROCEDURE — 85730 THROMBOPLASTIN TIME PARTIAL: CPT

## 2018-01-01 PROCEDURE — 99223 1ST HOSP IP/OBS HIGH 75: CPT | Performed by: INTERNAL MEDICINE

## 2018-01-01 PROCEDURE — 84132 ASSAY OF SERUM POTASSIUM: CPT

## 2018-01-01 PROCEDURE — 80048 BASIC METABOLIC PNL TOTAL CA: CPT

## 2018-01-01 PROCEDURE — 84295 ASSAY OF SERUM SODIUM: CPT

## 2018-01-01 PROCEDURE — 80053 COMPREHEN METABOLIC PANEL: CPT | Performed by: NURSE PRACTITIONER

## 2018-01-01 PROCEDURE — 87077 CULTURE AEROBIC IDENTIFY: CPT

## 2018-01-01 PROCEDURE — 85027 COMPLETE CBC AUTOMATED: CPT | Performed by: NURSE PRACTITIONER

## 2018-01-01 PROCEDURE — 76642 ULTRASOUND BREAST LIMITED: CPT

## 2018-01-01 PROCEDURE — 83036 HEMOGLOBIN GLYCOSYLATED A1C: CPT

## 2018-01-01 RX ORDER — BUSPIRONE HYDROCHLORIDE 15 MG/1
15 TABLET ORAL 3 TIMES DAILY
COMMUNITY

## 2018-01-01 RX ORDER — DULOXETIN HYDROCHLORIDE 60 MG/1
60 CAPSULE, DELAYED RELEASE ORAL DAILY
COMMUNITY

## 2018-01-01 RX ORDER — CLONIDINE HYDROCHLORIDE 0.1 MG/1
0.1 TABLET ORAL PRN
COMMUNITY

## 2018-01-01 RX ORDER — MEMANTINE HYDROCHLORIDE 5 MG/1
10 TABLET ORAL 2 TIMES DAILY
Status: DISCONTINUED | OUTPATIENT
Start: 2018-01-01 | End: 2018-01-01 | Stop reason: HOSPADM

## 2018-01-01 RX ORDER — DIVALPROEX SODIUM 500 MG/1
500 TABLET, DELAYED RELEASE ORAL 2 TIMES DAILY
COMMUNITY

## 2018-01-01 RX ORDER — CEFUROXIME AXETIL 500 MG/1
500 TABLET ORAL 2 TIMES DAILY
Qty: 20 TABLET | Refills: 0 | Status: SHIPPED | OUTPATIENT
Start: 2018-01-01 | End: 2018-12-15

## 2018-01-01 RX ORDER — ACETAMINOPHEN 500 MG
1000 TABLET ORAL ONCE
Status: DISCONTINUED | OUTPATIENT
Start: 2018-01-01 | End: 2018-01-01 | Stop reason: HOSPADM

## 2018-01-01 RX ORDER — ACETAMINOPHEN 650 MG/1
650 SUPPOSITORY RECTAL ONCE
Status: COMPLETED | OUTPATIENT
Start: 2018-01-01 | End: 2018-01-01

## 2018-01-01 RX ORDER — CEFEPIME HYDROCHLORIDE 2 G/50ML
2 INJECTION, SOLUTION INTRAVENOUS EVERY 12 HOURS
Status: DISCONTINUED | OUTPATIENT
Start: 2018-01-01 | End: 2018-01-01

## 2018-01-01 RX ORDER — TRAZODONE HYDROCHLORIDE 50 MG/1
50 TABLET ORAL NIGHTLY
COMMUNITY

## 2018-01-01 RX ORDER — DONEPEZIL HYDROCHLORIDE 5 MG/1
10 TABLET, FILM COATED ORAL NIGHTLY
Status: DISCONTINUED | OUTPATIENT
Start: 2018-01-01 | End: 2018-01-01 | Stop reason: HOSPADM

## 2018-01-01 RX ORDER — MIRTAZAPINE 15 MG/1
7.5 TABLET, FILM COATED ORAL NIGHTLY
COMMUNITY

## 2018-01-01 RX ORDER — KETOROLAC TROMETHAMINE 30 MG/ML
15 INJECTION, SOLUTION INTRAMUSCULAR; INTRAVENOUS ONCE
Status: COMPLETED | OUTPATIENT
Start: 2018-01-01 | End: 2018-01-01

## 2018-01-01 RX ORDER — 0.9 % SODIUM CHLORIDE 0.9 %
1000 INTRAVENOUS SOLUTION INTRAVENOUS ONCE
Status: COMPLETED | OUTPATIENT
Start: 2018-01-01 | End: 2018-01-01

## 2018-01-01 RX ORDER — SODIUM CHLORIDE 0.9 % (FLUSH) 0.9 %
10 SYRINGE (ML) INJECTION EVERY 12 HOURS SCHEDULED
Status: DISCONTINUED | OUTPATIENT
Start: 2018-01-01 | End: 2018-01-01 | Stop reason: HOSPADM

## 2018-01-01 RX ORDER — DULOXETIN HYDROCHLORIDE 30 MG/1
60 CAPSULE, DELAYED RELEASE ORAL DAILY
Status: DISCONTINUED | OUTPATIENT
Start: 2018-01-01 | End: 2018-01-01 | Stop reason: HOSPADM

## 2018-01-01 RX ORDER — FERROUS SULFATE 325(65) MG
325 TABLET ORAL
COMMUNITY

## 2018-01-01 RX ORDER — INSULIN GLARGINE 100 [IU]/ML
20 INJECTION, SOLUTION SUBCUTANEOUS NIGHTLY
Status: DISCONTINUED | OUTPATIENT
Start: 2018-01-01 | End: 2018-01-01 | Stop reason: HOSPADM

## 2018-01-01 RX ORDER — DIVALPROEX SODIUM 500 MG/1
500 TABLET, DELAYED RELEASE ORAL 2 TIMES DAILY
Status: DISCONTINUED | OUTPATIENT
Start: 2018-01-01 | End: 2018-01-01 | Stop reason: HOSPADM

## 2018-01-01 RX ORDER — DEXTROSE MONOHYDRATE 50 MG/ML
100 INJECTION, SOLUTION INTRAVENOUS PRN
Status: DISCONTINUED | OUTPATIENT
Start: 2018-01-01 | End: 2018-01-01 | Stop reason: HOSPADM

## 2018-01-01 RX ORDER — INSULIN GLARGINE 100 [IU]/ML
INJECTION, SOLUTION SUBCUTANEOUS
Qty: 10 ML | Refills: 0 | Status: SHIPPED | OUTPATIENT
Start: 2018-01-01

## 2018-01-01 RX ORDER — DIPHENHYDRAMINE HCL 25 MG
25 TABLET ORAL ONCE
Status: COMPLETED | OUTPATIENT
Start: 2018-01-01 | End: 2018-01-01

## 2018-01-01 RX ORDER — CLOPIDOGREL BISULFATE 75 MG/1
TABLET ORAL
Qty: 30 TABLET | Refills: 5 | Status: SHIPPED | OUTPATIENT
Start: 2018-01-01

## 2018-01-01 RX ORDER — DEXTROSE AND SODIUM CHLORIDE 5; .45 G/100ML; G/100ML
INJECTION, SOLUTION INTRAVENOUS CONTINUOUS
Status: DISCONTINUED | OUTPATIENT
Start: 2018-01-01 | End: 2018-01-01

## 2018-01-01 RX ORDER — GABAPENTIN 300 MG/1
300 CAPSULE ORAL 3 TIMES DAILY
COMMUNITY

## 2018-01-01 RX ORDER — DEXTROSE MONOHYDRATE 50 MG/ML
INJECTION, SOLUTION INTRAVENOUS CONTINUOUS
Status: DISCONTINUED | OUTPATIENT
Start: 2018-01-01 | End: 2018-01-01 | Stop reason: HOSPADM

## 2018-01-01 RX ORDER — ASPIRIN 81 MG/1
81 TABLET, CHEWABLE ORAL DAILY
Status: DISCONTINUED | OUTPATIENT
Start: 2018-01-01 | End: 2018-01-01 | Stop reason: HOSPADM

## 2018-01-01 RX ORDER — FERROUS SULFATE 325(65) MG
325 TABLET ORAL
Status: DISCONTINUED | OUTPATIENT
Start: 2018-01-01 | End: 2018-01-01 | Stop reason: HOSPADM

## 2018-01-01 RX ORDER — MEMANTINE HYDROCHLORIDE 10 MG/1
10 TABLET ORAL 2 TIMES DAILY
COMMUNITY

## 2018-01-01 RX ORDER — ATORVASTATIN CALCIUM 40 MG/1
TABLET, FILM COATED ORAL
Qty: 30 TABLET | Refills: 5 | Status: SHIPPED | OUTPATIENT
Start: 2018-01-01

## 2018-01-01 RX ORDER — CLOPIDOGREL BISULFATE 75 MG/1
75 TABLET ORAL DAILY
Status: DISCONTINUED | OUTPATIENT
Start: 2018-01-01 | End: 2018-01-01 | Stop reason: HOSPADM

## 2018-01-01 RX ORDER — MAGNESIUM OXIDE 400 MG/1
400 TABLET ORAL 2 TIMES DAILY
COMMUNITY

## 2018-01-01 RX ORDER — ATORVASTATIN CALCIUM 40 MG/1
40 TABLET, FILM COATED ORAL NIGHTLY
Status: DISCONTINUED | OUTPATIENT
Start: 2018-01-01 | End: 2018-01-01 | Stop reason: HOSPADM

## 2018-01-01 RX ORDER — DEXTROSE MONOHYDRATE 25 G/50ML
12.5 INJECTION, SOLUTION INTRAVENOUS PRN
Status: DISCONTINUED | OUTPATIENT
Start: 2018-01-01 | End: 2018-01-01 | Stop reason: HOSPADM

## 2018-01-01 RX ORDER — NICOTINE POLACRILEX 4 MG
15 LOZENGE BUCCAL PRN
Status: DISCONTINUED | OUTPATIENT
Start: 2018-01-01 | End: 2018-01-01 | Stop reason: HOSPADM

## 2018-01-01 RX ORDER — ACETAMINOPHEN 650 MG/1
650 SUPPOSITORY RECTAL EVERY 4 HOURS PRN
Status: DISCONTINUED | OUTPATIENT
Start: 2018-01-01 | End: 2018-01-01 | Stop reason: HOSPADM

## 2018-01-01 RX ORDER — SODIUM CHLORIDE 0.9 % (FLUSH) 0.9 %
10 SYRINGE (ML) INJECTION PRN
Status: DISCONTINUED | OUTPATIENT
Start: 2018-01-01 | End: 2018-01-01 | Stop reason: HOSPADM

## 2018-01-01 RX ADMIN — Medication 10 ML: at 10:01

## 2018-01-01 RX ADMIN — DEXTROSE AND SODIUM CHLORIDE: 5; 450 INJECTION, SOLUTION INTRAVENOUS at 12:10

## 2018-01-01 RX ADMIN — KETOROLAC TROMETHAMINE 15 MG: 30 INJECTION, SOLUTION INTRAMUSCULAR at 09:32

## 2018-01-01 RX ADMIN — INSULIN GLARGINE 20 UNITS: 100 INJECTION, SOLUTION SUBCUTANEOUS at 20:35

## 2018-01-01 RX ADMIN — ENOXAPARIN SODIUM 40 MG: 40 INJECTION SUBCUTANEOUS at 17:38

## 2018-01-01 RX ADMIN — SODIUM CHLORIDE 1000 ML: 9 INJECTION, SOLUTION INTRAVENOUS at 08:46

## 2018-01-01 RX ADMIN — INSULIN LISPRO 2 UNITS: 100 INJECTION, SOLUTION INTRAVENOUS; SUBCUTANEOUS at 18:50

## 2018-01-01 RX ADMIN — ACETAMINOPHEN 650 MG: 650 SUPPOSITORY RECTAL at 09:32

## 2018-01-01 RX ADMIN — Medication 2 G: at 17:38

## 2018-01-01 RX ADMIN — SODIUM CHLORIDE 1000 ML: 9 INJECTION, SOLUTION INTRAVENOUS at 07:44

## 2018-01-01 RX ADMIN — CEFTRIAXONE 1 G: 1 INJECTION, POWDER, FOR SOLUTION INTRAMUSCULAR; INTRAVENOUS at 10:44

## 2018-01-01 RX ADMIN — MEROPENEM 1 G: 1 INJECTION, POWDER, FOR SOLUTION INTRAVENOUS at 09:50

## 2018-01-01 RX ADMIN — DEXTROSE MONOHYDRATE: 50 INJECTION, SOLUTION INTRAVENOUS at 09:43

## 2018-01-01 RX ADMIN — DEXTROSE AND SODIUM CHLORIDE: 5; 450 INJECTION, SOLUTION INTRAVENOUS at 03:58

## 2018-01-01 RX ADMIN — INSULIN LISPRO 1 UNITS: 100 INJECTION, SOLUTION INTRAVENOUS; SUBCUTANEOUS at 23:35

## 2018-01-01 RX ADMIN — Medication 2 G: at 03:55

## 2018-01-01 RX ADMIN — SODIUM CHLORIDE 2 G: 9 INJECTION INTRAMUSCULAR; INTRAVENOUS; SUBCUTANEOUS at 07:43

## 2018-01-01 RX ADMIN — INSULIN LISPRO 2 UNITS: 100 INJECTION, SOLUTION INTRAVENOUS; SUBCUTANEOUS at 03:55

## 2018-01-01 RX ADMIN — DIPHENHYDRAMINE HCL 25 MG: 25 TABLET ORAL at 03:08

## 2018-01-01 ASSESSMENT — ENCOUNTER SYMPTOMS
TROUBLE SWALLOWING: 0
VOMITING: 0
ABDOMINAL DISTENTION: 0
BACK PAIN: 0
CONSTIPATION: 0
DIARRHEA: 0
SORE THROAT: 0
COLOR CHANGE: 0
CHEST TIGHTNESS: 0
RHINORRHEA: 0
PHOTOPHOBIA: 0
CHEST TIGHTNESS: 0
WHEEZING: 0
SHORTNESS OF BREATH: 0
EYE PAIN: 0
COUGH: 0
NAUSEA: 0
SHORTNESS OF BREATH: 0
ABDOMINAL PAIN: 0

## 2018-01-01 ASSESSMENT — PAIN SCALES - GENERAL
PAINLEVEL_OUTOF10: 7
PAINLEVEL_OUTOF10: 0
PAINLEVEL_OUTOF10: 9
PAINLEVEL_OUTOF10: 2

## 2018-01-01 ASSESSMENT — PAIN DESCRIPTION - LOCATION
LOCATION: HEAD;NECK
LOCATION: ARM;HEAD;NECK

## 2018-01-01 ASSESSMENT — PAIN DESCRIPTION - PAIN TYPE: TYPE: ACUTE PAIN

## 2018-01-02 ENCOUNTER — LAB REQUISITION (OUTPATIENT)
Dept: LAB | Facility: HOSPITAL | Age: 68
End: 2018-01-02

## 2018-01-02 DIAGNOSIS — Z00.00 ENCOUNTER FOR GENERAL ADULT MEDICAL EXAMINATION WITHOUT ABNORMAL FINDINGS: ICD-10-CM

## 2018-01-02 LAB
ANION GAP SERPL CALCULATED.3IONS-SCNC: 15 MMOL/L (ref 4–13)
BASOPHILS # BLD MANUAL: 0.07 10*3/MM3 (ref 0–0.2)
BASOPHILS NFR BLD AUTO: 1 % (ref 0–2)
BILIRUB UR QL STRIP: ABNORMAL
BUN BLD-MCNC: 17 MG/DL (ref 5–21)
BUN/CREAT SERPL: 25 (ref 7–25)
CALCIUM SPEC-SCNC: 9.1 MG/DL (ref 8.4–10.4)
CHLORIDE SERPL-SCNC: 100 MMOL/L (ref 98–110)
CLARITY UR: CLEAR
CO2 SERPL-SCNC: 27 MMOL/L (ref 24–31)
COLOR UR: ABNORMAL
CREAT BLD-MCNC: 0.68 MG/DL (ref 0.5–1.4)
DEPRECATED RDW RBC AUTO: 44.6 FL (ref 40–54)
EOSINOPHIL # BLD MANUAL: 0.14 10*3/MM3 (ref 0–0.7)
EOSINOPHIL NFR BLD MANUAL: 2 % (ref 0–4)
ERYTHROCYTE [DISTWIDTH] IN BLOOD BY AUTOMATED COUNT: 13.9 % (ref 12–15)
GFR SERPL CREATININE-BSD FRML MDRD: 86 ML/MIN/1.73
GLUCOSE BLD-MCNC: 201 MG/DL (ref 70–100)
GLUCOSE UR STRIP-MCNC: ABNORMAL MG/DL
HCT VFR BLD AUTO: 38.7 % (ref 37–47)
HGB BLD-MCNC: 12.5 G/DL (ref 12–16)
HGB UR QL STRIP.AUTO: NEGATIVE
HYPOCHROMIA BLD QL: ABNORMAL
KETONES UR QL STRIP: ABNORMAL
LEUKOCYTE ESTERASE UR QL STRIP.AUTO: NEGATIVE
LYMPHOCYTES # BLD MANUAL: 1.93 10*3/MM3 (ref 0.72–4.86)
LYMPHOCYTES NFR BLD MANUAL: 2 % (ref 4–12)
LYMPHOCYTES NFR BLD MANUAL: 28 % (ref 15–45)
MCH RBC QN AUTO: 28.6 PG (ref 28–32)
MCHC RBC AUTO-ENTMCNC: 32.3 G/DL (ref 33–36)
MCV RBC AUTO: 88.6 FL (ref 82–98)
MONOCYTES # BLD AUTO: 0.14 10*3/MM3 (ref 0.19–1.3)
NEUTROPHILS # BLD AUTO: 4.48 10*3/MM3 (ref 1.87–8.4)
NEUTROPHILS NFR BLD MANUAL: 64 % (ref 39–78)
NEUTS BAND NFR BLD MANUAL: 1 % (ref 0–10)
NITRITE UR QL STRIP: NEGATIVE
PH UR STRIP.AUTO: <=5 [PH] (ref 5–8)
PLAT MORPH BLD: NORMAL
PLATELET # BLD AUTO: 276 10*3/MM3 (ref 130–400)
PMV BLD AUTO: 9.9 FL (ref 6–12)
POTASSIUM BLD-SCNC: 4.1 MMOL/L (ref 3.5–5.3)
PROT UR QL STRIP: ABNORMAL
RBC # BLD AUTO: 4.37 10*6/MM3 (ref 4.2–5.4)
SCAN SLIDE: NORMAL
SODIUM BLD-SCNC: 142 MMOL/L (ref 135–145)
SP GR UR STRIP: >1.03 (ref 1–1.03)
UROBILINOGEN UR QL STRIP: ABNORMAL
VARIANT LYMPHS NFR BLD MANUAL: 2 % (ref 0–5)
WBC MORPH BLD: NORMAL
WBC NRBC COR # BLD: 6.89 10*3/MM3 (ref 4.8–10.8)

## 2018-01-02 PROCEDURE — 80048 BASIC METABOLIC PNL TOTAL CA: CPT | Performed by: NURSE PRACTITIONER

## 2018-01-02 PROCEDURE — 85007 BL SMEAR W/DIFF WBC COUNT: CPT | Performed by: NURSE PRACTITIONER

## 2018-01-02 PROCEDURE — 81003 URINALYSIS AUTO W/O SCOPE: CPT | Performed by: NURSE PRACTITIONER

## 2018-01-02 PROCEDURE — 85025 COMPLETE CBC W/AUTO DIFF WBC: CPT | Performed by: NURSE PRACTITIONER

## 2018-01-15 ENCOUNTER — LAB REQUISITION (OUTPATIENT)
Dept: LAB | Facility: HOSPITAL | Age: 68
End: 2018-01-15

## 2018-01-15 DIAGNOSIS — Z00.00 ENCOUNTER FOR GENERAL ADULT MEDICAL EXAMINATION WITHOUT ABNORMAL FINDINGS: ICD-10-CM

## 2018-01-15 LAB
ANION GAP SERPL CALCULATED.3IONS-SCNC: 12 MMOL/L (ref 4–13)
BUN BLD-MCNC: 15 MG/DL (ref 5–21)
BUN/CREAT SERPL: 17.9 (ref 7–25)
CALCIUM SPEC-SCNC: 9.2 MG/DL (ref 8.4–10.4)
CHLORIDE SERPL-SCNC: 100 MMOL/L (ref 98–110)
CO2 SERPL-SCNC: 28 MMOL/L (ref 24–31)
CREAT BLD-MCNC: 0.84 MG/DL (ref 0.5–1.4)
DEPRECATED RDW RBC AUTO: 42 FL (ref 40–54)
ERYTHROCYTE [DISTWIDTH] IN BLOOD BY AUTOMATED COUNT: 13.4 % (ref 12–15)
GFR SERPL CREATININE-BSD FRML MDRD: 67 ML/MIN/1.73
GLUCOSE BLD-MCNC: 88 MG/DL (ref 70–100)
HCT VFR BLD AUTO: 36.7 % (ref 37–47)
HGB BLD-MCNC: 12.4 G/DL (ref 12–16)
MCH RBC QN AUTO: 29.4 PG (ref 28–32)
MCHC RBC AUTO-ENTMCNC: 33.8 G/DL (ref 33–36)
MCV RBC AUTO: 87 FL (ref 82–98)
PLATELET # BLD AUTO: 310 10*3/MM3 (ref 130–400)
PMV BLD AUTO: 9.7 FL (ref 6–12)
POTASSIUM BLD-SCNC: 4.6 MMOL/L (ref 3.5–5.3)
RBC # BLD AUTO: 4.22 10*6/MM3 (ref 4.2–5.4)
SODIUM BLD-SCNC: 140 MMOL/L (ref 135–145)
WBC NRBC COR # BLD: 6.64 10*3/MM3 (ref 4.8–10.8)

## 2018-01-15 PROCEDURE — 80048 BASIC METABOLIC PNL TOTAL CA: CPT | Performed by: NURSE PRACTITIONER

## 2018-01-15 PROCEDURE — 85027 COMPLETE CBC AUTOMATED: CPT | Performed by: NURSE PRACTITIONER

## 2018-01-15 PROCEDURE — 36415 COLL VENOUS BLD VENIPUNCTURE: CPT | Performed by: NURSE PRACTITIONER

## 2018-01-31 ENCOUNTER — LAB REQUISITION (OUTPATIENT)
Dept: LAB | Facility: HOSPITAL | Age: 68
End: 2018-01-31

## 2018-01-31 DIAGNOSIS — Z00.00 ENCOUNTER FOR GENERAL ADULT MEDICAL EXAMINATION WITHOUT ABNORMAL FINDINGS: ICD-10-CM

## 2018-01-31 LAB
ANION GAP SERPL CALCULATED.3IONS-SCNC: 15 MMOL/L (ref 4–13)
BUN BLD-MCNC: 13 MG/DL (ref 5–21)
BUN/CREAT SERPL: 18.3 (ref 7–25)
CALCIUM SPEC-SCNC: 9.8 MG/DL (ref 8.4–10.4)
CHLORIDE SERPL-SCNC: 100 MMOL/L (ref 98–110)
CO2 SERPL-SCNC: 25 MMOL/L (ref 24–31)
CREAT BLD-MCNC: 0.71 MG/DL (ref 0.5–1.4)
DEPRECATED RDW RBC AUTO: 43.1 FL (ref 40–54)
ERYTHROCYTE [DISTWIDTH] IN BLOOD BY AUTOMATED COUNT: 13.5 % (ref 12–15)
GFR SERPL CREATININE-BSD FRML MDRD: 82 ML/MIN/1.73
GLUCOSE BLD-MCNC: 147 MG/DL (ref 70–100)
HCT VFR BLD AUTO: 39.5 % (ref 37–47)
HGB BLD-MCNC: 12.7 G/DL (ref 12–16)
MAGNESIUM SERPL-MCNC: 1.3 MG/DL (ref 1.4–2.2)
MCH RBC QN AUTO: 28.2 PG (ref 28–32)
MCHC RBC AUTO-ENTMCNC: 32.2 G/DL (ref 33–36)
MCV RBC AUTO: 87.8 FL (ref 82–98)
PLATELET # BLD AUTO: 293 10*3/MM3 (ref 130–400)
PMV BLD AUTO: 10.2 FL (ref 6–12)
POTASSIUM BLD-SCNC: 4.5 MMOL/L (ref 3.5–5.3)
RBC # BLD AUTO: 4.5 10*6/MM3 (ref 4.2–5.4)
SODIUM BLD-SCNC: 140 MMOL/L (ref 135–145)
WBC NRBC COR # BLD: 7.06 10*3/MM3 (ref 4.8–10.8)

## 2018-01-31 PROCEDURE — 36415 COLL VENOUS BLD VENIPUNCTURE: CPT | Performed by: NURSE PRACTITIONER

## 2018-01-31 PROCEDURE — 85027 COMPLETE CBC AUTOMATED: CPT | Performed by: NURSE PRACTITIONER

## 2018-01-31 PROCEDURE — 80048 BASIC METABOLIC PNL TOTAL CA: CPT | Performed by: NURSE PRACTITIONER

## 2018-01-31 PROCEDURE — 83735 ASSAY OF MAGNESIUM: CPT | Performed by: NURSE PRACTITIONER

## 2018-03-14 ENCOUNTER — LAB REQUISITION (OUTPATIENT)
Dept: LAB | Facility: HOSPITAL | Age: 68
End: 2018-03-14

## 2018-03-14 DIAGNOSIS — Z00.00 ENCOUNTER FOR GENERAL ADULT MEDICAL EXAMINATION WITHOUT ABNORMAL FINDINGS: ICD-10-CM

## 2018-03-14 LAB
FLUAV AG NPH QL: NEGATIVE
FLUBV AG NPH QL IA: NEGATIVE

## 2018-03-14 PROCEDURE — 87804 INFLUENZA ASSAY W/OPTIC: CPT | Performed by: NURSE PRACTITIONER

## 2018-03-15 ENCOUNTER — LAB REQUISITION (OUTPATIENT)
Dept: LAB | Facility: HOSPITAL | Age: 68
End: 2018-03-15

## 2018-03-15 DIAGNOSIS — Z00.00 ENCOUNTER FOR GENERAL ADULT MEDICAL EXAMINATION WITHOUT ABNORMAL FINDINGS: ICD-10-CM

## 2018-03-15 LAB
ANION GAP SERPL CALCULATED.3IONS-SCNC: 16 MMOL/L (ref 4–13)
BASOPHILS # BLD AUTO: 0.04 10*3/MM3 (ref 0–0.2)
BASOPHILS NFR BLD AUTO: 0.7 % (ref 0–2)
BUN BLD-MCNC: 20 MG/DL (ref 5–21)
BUN/CREAT SERPL: 25.3 (ref 7–25)
CALCIUM SPEC-SCNC: 9.7 MG/DL (ref 8.4–10.4)
CHLORIDE SERPL-SCNC: 97 MMOL/L (ref 98–110)
CO2 SERPL-SCNC: 29 MMOL/L (ref 24–31)
CREAT BLD-MCNC: 0.79 MG/DL (ref 0.5–1.4)
DEPRECATED RDW RBC AUTO: 39.9 FL (ref 40–54)
EOSINOPHIL # BLD AUTO: 0.22 10*3/MM3 (ref 0–0.7)
EOSINOPHIL NFR BLD AUTO: 3.7 % (ref 0–4)
ERYTHROCYTE [DISTWIDTH] IN BLOOD BY AUTOMATED COUNT: 12.7 % (ref 12–15)
GFR SERPL CREATININE-BSD FRML MDRD: 72 ML/MIN/1.73
GLUCOSE BLD-MCNC: 97 MG/DL (ref 70–100)
HCT VFR BLD AUTO: 40.9 % (ref 37–47)
HGB BLD-MCNC: 13.5 G/DL (ref 12–16)
IMM GRANULOCYTES # BLD: 0.01 10*3/MM3 (ref 0–0.03)
IMM GRANULOCYTES NFR BLD: 0.2 % (ref 0–5)
LYMPHOCYTES # BLD AUTO: 2.21 10*3/MM3 (ref 0.72–4.86)
LYMPHOCYTES NFR BLD AUTO: 37.5 % (ref 15–45)
MCH RBC QN AUTO: 28.5 PG (ref 28–32)
MCHC RBC AUTO-ENTMCNC: 33 G/DL (ref 33–36)
MCV RBC AUTO: 86.3 FL (ref 82–98)
MONOCYTES # BLD AUTO: 0.47 10*3/MM3 (ref 0.19–1.3)
MONOCYTES NFR BLD AUTO: 8 % (ref 4–12)
NEUTROPHILS # BLD AUTO: 2.94 10*3/MM3 (ref 1.87–8.4)
NEUTROPHILS NFR BLD AUTO: 49.9 % (ref 39–78)
NRBC BLD MANUAL-RTO: 0 /100 WBC (ref 0–0)
PLATELET # BLD AUTO: 325 10*3/MM3 (ref 130–400)
PMV BLD AUTO: 9.5 FL (ref 6–12)
POTASSIUM BLD-SCNC: 3.6 MMOL/L (ref 3.5–5.3)
RBC # BLD AUTO: 4.74 10*6/MM3 (ref 4.2–5.4)
SODIUM BLD-SCNC: 142 MMOL/L (ref 135–145)
WBC NRBC COR # BLD: 5.89 10*3/MM3 (ref 4.8–10.8)

## 2018-03-15 PROCEDURE — 80048 BASIC METABOLIC PNL TOTAL CA: CPT | Performed by: NURSE PRACTITIONER

## 2018-03-15 PROCEDURE — 85025 COMPLETE CBC W/AUTO DIFF WBC: CPT | Performed by: NURSE PRACTITIONER

## 2018-03-15 PROCEDURE — 36415 COLL VENOUS BLD VENIPUNCTURE: CPT | Performed by: NURSE PRACTITIONER

## 2018-03-17 PROCEDURE — 81001 URINALYSIS AUTO W/SCOPE: CPT | Performed by: FAMILY MEDICINE

## 2018-03-17 PROCEDURE — 87086 URINE CULTURE/COLONY COUNT: CPT | Performed by: FAMILY MEDICINE

## 2018-03-18 ENCOUNTER — LAB REQUISITION (OUTPATIENT)
Dept: LAB | Facility: HOSPITAL | Age: 68
End: 2018-03-18

## 2018-03-18 DIAGNOSIS — Z00.00 ENCOUNTER FOR GENERAL ADULT MEDICAL EXAMINATION WITHOUT ABNORMAL FINDINGS: ICD-10-CM

## 2018-03-18 LAB
BACTERIA UR QL AUTO: ABNORMAL /HPF
BILIRUB UR QL STRIP: NEGATIVE
CLARITY UR: ABNORMAL
COLOR UR: ABNORMAL
GLUCOSE UR STRIP-MCNC: NEGATIVE MG/DL
HGB UR QL STRIP.AUTO: NEGATIVE
HYALINE CASTS UR QL AUTO: ABNORMAL /LPF
KETONES UR QL STRIP: ABNORMAL
LEUKOCYTE ESTERASE UR QL STRIP.AUTO: ABNORMAL
NITRITE UR QL STRIP: NEGATIVE
PH UR STRIP.AUTO: <=5 [PH] (ref 5–8)
PROT UR QL STRIP: ABNORMAL
RBC # UR: ABNORMAL /HPF
REF LAB TEST METHOD: ABNORMAL
SP GR UR STRIP: >1.03 (ref 1–1.03)
SQUAMOUS #/AREA URNS HPF: ABNORMAL /HPF
UROBILINOGEN UR QL STRIP: ABNORMAL
WBC UR QL AUTO: ABNORMAL /HPF
YEAST URNS QL MICRO: ABNORMAL /HPF

## 2018-03-20 LAB
BACTERIA SPEC AEROBE CULT: ABNORMAL
BACTERIA SPEC AEROBE CULT: ABNORMAL
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
## 2018-04-18 ENCOUNTER — LAB REQUISITION (OUTPATIENT)
Dept: LAB | Facility: HOSPITAL | Age: 68
End: 2018-04-18

## 2018-04-18 DIAGNOSIS — Z00.00 ENCOUNTER FOR GENERAL ADULT MEDICAL EXAMINATION WITHOUT ABNORMAL FINDINGS: ICD-10-CM

## 2018-04-18 LAB
ALBUMIN SERPL-MCNC: 4 G/DL (ref 3.5–5)
ALBUMIN/GLOB SERPL: 1.5 G/DL (ref 1.1–2.5)
ALP SERPL-CCNC: 51 U/L (ref 24–120)
ALT SERPL W P-5'-P-CCNC: 19 U/L (ref 0–54)
ANION GAP SERPL CALCULATED.3IONS-SCNC: 12 MMOL/L (ref 4–13)
AST SERPL-CCNC: 23 U/L (ref 7–45)
BILIRUB SERPL-MCNC: 0.4 MG/DL (ref 0.1–1)
BUN BLD-MCNC: 22 MG/DL (ref 5–21)
BUN/CREAT SERPL: 24.4 (ref 7–25)
CALCIUM SPEC-SCNC: 9 MG/DL (ref 8.4–10.4)
CHLORIDE SERPL-SCNC: 101 MMOL/L (ref 98–110)
CO2 SERPL-SCNC: 29 MMOL/L (ref 24–31)
CREAT BLD-MCNC: 0.9 MG/DL (ref 0.5–1.4)
DEPRECATED RDW RBC AUTO: 41 FL (ref 40–54)
ERYTHROCYTE [DISTWIDTH] IN BLOOD BY AUTOMATED COUNT: 13.2 % (ref 12–15)
GFR SERPL CREATININE-BSD FRML MDRD: 62 ML/MIN/1.73
GLOBULIN UR ELPH-MCNC: 2.6 GM/DL
GLUCOSE BLD-MCNC: 126 MG/DL (ref 70–100)
HCT VFR BLD AUTO: 33.9 % (ref 37–47)
HGB BLD-MCNC: 11.3 G/DL (ref 12–16)
MAGNESIUM SERPL-MCNC: 0.9 MG/DL (ref 1.4–2.2)
MCH RBC QN AUTO: 28.8 PG (ref 28–32)
MCHC RBC AUTO-ENTMCNC: 33.3 G/DL (ref 33–36)
MCV RBC AUTO: 86.3 FL (ref 82–98)
PLATELET # BLD AUTO: 281 10*3/MM3 (ref 130–400)
PMV BLD AUTO: 10.1 FL (ref 6–12)
POTASSIUM BLD-SCNC: 3.7 MMOL/L (ref 3.5–5.3)
PROT SERPL-MCNC: 6.6 G/DL (ref 6.3–8.7)
RBC # BLD AUTO: 3.93 10*6/MM3 (ref 4.2–5.4)
SODIUM BLD-SCNC: 142 MMOL/L (ref 135–145)
WBC NRBC COR # BLD: 5.72 10*3/MM3 (ref 4.8–10.8)

## 2018-04-18 PROCEDURE — 85027 COMPLETE CBC AUTOMATED: CPT | Performed by: INTERNAL MEDICINE

## 2018-04-18 PROCEDURE — 36415 COLL VENOUS BLD VENIPUNCTURE: CPT | Performed by: INTERNAL MEDICINE

## 2018-04-18 PROCEDURE — 83735 ASSAY OF MAGNESIUM: CPT | Performed by: INTERNAL MEDICINE

## 2018-04-18 PROCEDURE — 80053 COMPREHEN METABOLIC PANEL: CPT | Performed by: INTERNAL MEDICINE

## 2018-05-07 ENCOUNTER — LAB REQUISITION (OUTPATIENT)
Dept: LAB | Facility: HOSPITAL | Age: 68
End: 2018-05-07

## 2018-05-07 DIAGNOSIS — Z00.00 ENCOUNTER FOR GENERAL ADULT MEDICAL EXAMINATION WITHOUT ABNORMAL FINDINGS: ICD-10-CM

## 2018-05-07 PROCEDURE — 87086 URINE CULTURE/COLONY COUNT: CPT | Performed by: NURSE PRACTITIONER

## 2018-05-08 ENCOUNTER — LAB REQUISITION (OUTPATIENT)
Dept: LAB | Facility: HOSPITAL | Age: 68
End: 2018-05-08

## 2018-05-08 DIAGNOSIS — E11.65 TYPE 2 DIABETES MELLITUS WITH HYPERGLYCEMIA (HCC): ICD-10-CM

## 2018-05-08 DIAGNOSIS — Z91.81 HISTORY OF FALLING: ICD-10-CM

## 2018-05-08 DIAGNOSIS — R55 SYNCOPE AND COLLAPSE: ICD-10-CM

## 2018-05-08 LAB
ANION GAP SERPL CALCULATED.3IONS-SCNC: 10 MMOL/L (ref 4–13)
BASOPHILS # BLD AUTO: 0.04 10*3/MM3 (ref 0–0.2)
BASOPHILS NFR BLD AUTO: 0.9 % (ref 0–2)
BUN BLD-MCNC: 19 MG/DL (ref 5–21)
BUN/CREAT SERPL: 25 (ref 7–25)
CALCIUM SPEC-SCNC: 9.2 MG/DL (ref 8.4–10.4)
CHLORIDE SERPL-SCNC: 95 MMOL/L (ref 98–110)
CO2 SERPL-SCNC: 32 MMOL/L (ref 24–31)
CREAT BLD-MCNC: 0.76 MG/DL (ref 0.5–1.4)
DEPRECATED RDW RBC AUTO: 40.2 FL (ref 40–54)
EOSINOPHIL # BLD AUTO: 0.12 10*3/MM3 (ref 0–0.7)
EOSINOPHIL NFR BLD AUTO: 2.8 % (ref 0–4)
ERYTHROCYTE [DISTWIDTH] IN BLOOD BY AUTOMATED COUNT: 12.9 % (ref 12–15)
GFR SERPL CREATININE-BSD FRML MDRD: 75 ML/MIN/1.73
GLUCOSE BLD-MCNC: 128 MG/DL (ref 70–100)
HCT VFR BLD AUTO: 31.6 % (ref 37–47)
HGB BLD-MCNC: 10.7 G/DL (ref 12–16)
IMM GRANULOCYTES # BLD: 0.01 10*3/MM3 (ref 0–0.03)
IMM GRANULOCYTES NFR BLD: 0.2 % (ref 0–5)
LYMPHOCYTES # BLD AUTO: 1.51 10*3/MM3 (ref 0.72–4.86)
LYMPHOCYTES NFR BLD AUTO: 35.1 % (ref 15–45)
MCH RBC QN AUTO: 29.2 PG (ref 28–32)
MCHC RBC AUTO-ENTMCNC: 33.9 G/DL (ref 33–36)
MCV RBC AUTO: 86.1 FL (ref 82–98)
MONOCYTES # BLD AUTO: 0.43 10*3/MM3 (ref 0.19–1.3)
MONOCYTES NFR BLD AUTO: 10 % (ref 4–12)
NEUTROPHILS # BLD AUTO: 2.19 10*3/MM3 (ref 1.87–8.4)
NEUTROPHILS NFR BLD AUTO: 51 % (ref 39–78)
NRBC BLD MANUAL-RTO: 0 /100 WBC (ref 0–0)
PLATELET # BLD AUTO: 303 10*3/MM3 (ref 130–400)
PMV BLD AUTO: 10 FL (ref 6–12)
POTASSIUM BLD-SCNC: 3.8 MMOL/L (ref 3.5–5.3)
PREALB SERPL-MCNC: 20.7 MG/DL (ref 18–36)
RBC # BLD AUTO: 3.67 10*6/MM3 (ref 4.2–5.4)
SODIUM BLD-SCNC: 137 MMOL/L (ref 135–145)
WBC NRBC COR # BLD: 4.3 10*3/MM3 (ref 4.8–10.8)

## 2018-05-08 PROCEDURE — 80048 BASIC METABOLIC PNL TOTAL CA: CPT | Performed by: NURSE PRACTITIONER

## 2018-05-08 PROCEDURE — 36415 COLL VENOUS BLD VENIPUNCTURE: CPT | Performed by: NURSE PRACTITIONER

## 2018-05-08 PROCEDURE — 84134 ASSAY OF PREALBUMIN: CPT | Performed by: NURSE PRACTITIONER

## 2018-05-08 PROCEDURE — 85025 COMPLETE CBC W/AUTO DIFF WBC: CPT | Performed by: NURSE PRACTITIONER

## 2018-05-09 LAB — BACTERIA SPEC AEROBE CULT: NORMAL

## 2018-05-18 PROCEDURE — 81003 URINALYSIS AUTO W/O SCOPE: CPT | Performed by: FAMILY MEDICINE

## 2018-05-19 ENCOUNTER — LAB REQUISITION (OUTPATIENT)
Dept: LAB | Facility: HOSPITAL | Age: 68
End: 2018-05-19

## 2018-05-19 DIAGNOSIS — Z00.00 ENCOUNTER FOR GENERAL ADULT MEDICAL EXAMINATION WITHOUT ABNORMAL FINDINGS: ICD-10-CM

## 2018-05-19 LAB
BILIRUB UR QL STRIP: NEGATIVE
CLARITY UR: CLEAR
COLOR UR: YELLOW
GLUCOSE UR STRIP-MCNC: NEGATIVE MG/DL
HGB UR QL STRIP.AUTO: NEGATIVE
KETONES UR QL STRIP: ABNORMAL
LEUKOCYTE ESTERASE UR QL STRIP.AUTO: NEGATIVE
NITRITE UR QL STRIP: NEGATIVE
PH UR STRIP.AUTO: <=5 [PH] (ref 5–8)
PROT UR QL STRIP: NEGATIVE
SP GR UR STRIP: 1.02 (ref 1–1.03)
UROBILINOGEN UR QL STRIP: ABNORMAL

## 2018-05-21 ENCOUNTER — LAB REQUISITION (OUTPATIENT)
Dept: LAB | Facility: HOSPITAL | Age: 68
End: 2018-05-21

## 2018-05-21 DIAGNOSIS — Z00.00 ENCOUNTER FOR GENERAL ADULT MEDICAL EXAMINATION WITHOUT ABNORMAL FINDINGS: ICD-10-CM

## 2018-05-21 LAB
ANION GAP SERPL CALCULATED.3IONS-SCNC: 11 MMOL/L (ref 4–13)
BASOPHILS # BLD AUTO: 0.03 10*3/MM3 (ref 0–0.2)
BASOPHILS NFR BLD AUTO: 0.7 % (ref 0–2)
BUN BLD-MCNC: 29 MG/DL (ref 5–21)
BUN/CREAT SERPL: 29 (ref 7–25)
CALCIUM SPEC-SCNC: 9.9 MG/DL (ref 8.4–10.4)
CHLORIDE SERPL-SCNC: 98 MMOL/L (ref 98–110)
CO2 SERPL-SCNC: 32 MMOL/L (ref 24–31)
CREAT BLD-MCNC: 1 MG/DL (ref 0.5–1.4)
DEPRECATED RDW RBC AUTO: 41.6 FL (ref 40–54)
EOSINOPHIL # BLD AUTO: 0.19 10*3/MM3 (ref 0–0.7)
EOSINOPHIL NFR BLD AUTO: 4.2 % (ref 0–4)
ERYTHROCYTE [DISTWIDTH] IN BLOOD BY AUTOMATED COUNT: 13 % (ref 12–15)
ERYTHROCYTE [SEDIMENTATION RATE] IN BLOOD: 16 MM/HR (ref 0–20)
GFR SERPL CREATININE-BSD FRML MDRD: 55 ML/MIN/1.73
GLUCOSE BLD-MCNC: 85 MG/DL (ref 70–100)
HCT VFR BLD AUTO: 33.9 % (ref 37–47)
HGB BLD-MCNC: 11.1 G/DL (ref 12–16)
IMM GRANULOCYTES # BLD: 0.01 10*3/MM3 (ref 0–0.03)
IMM GRANULOCYTES NFR BLD: 0.2 % (ref 0–5)
LYMPHOCYTES # BLD AUTO: 1.86 10*3/MM3 (ref 0.72–4.86)
LYMPHOCYTES NFR BLD AUTO: 40.9 % (ref 15–45)
MCH RBC QN AUTO: 28.8 PG (ref 28–32)
MCHC RBC AUTO-ENTMCNC: 32.7 G/DL (ref 33–36)
MCV RBC AUTO: 88.1 FL (ref 82–98)
MONOCYTES # BLD AUTO: 0.38 10*3/MM3 (ref 0.19–1.3)
MONOCYTES NFR BLD AUTO: 8.4 % (ref 4–12)
NEUTROPHILS # BLD AUTO: 2.08 10*3/MM3 (ref 1.87–8.4)
NEUTROPHILS NFR BLD AUTO: 45.6 % (ref 39–78)
NRBC BLD MANUAL-RTO: 0 /100 WBC (ref 0–0)
PLATELET # BLD AUTO: 306 10*3/MM3 (ref 130–400)
PMV BLD AUTO: 9.7 FL (ref 6–12)
POTASSIUM BLD-SCNC: 4 MMOL/L (ref 3.5–5.3)
RBC # BLD AUTO: 3.85 10*6/MM3 (ref 4.2–5.4)
SODIUM BLD-SCNC: 141 MMOL/L (ref 135–145)
WBC NRBC COR # BLD: 4.55 10*3/MM3 (ref 4.8–10.8)

## 2018-05-21 PROCEDURE — 36415 COLL VENOUS BLD VENIPUNCTURE: CPT | Performed by: NURSE PRACTITIONER

## 2018-05-21 PROCEDURE — 85025 COMPLETE CBC W/AUTO DIFF WBC: CPT | Performed by: NURSE PRACTITIONER

## 2018-05-21 PROCEDURE — 80048 BASIC METABOLIC PNL TOTAL CA: CPT | Performed by: NURSE PRACTITIONER

## 2018-05-21 PROCEDURE — 85651 RBC SED RATE NONAUTOMATED: CPT | Performed by: NURSE PRACTITIONER

## 2018-06-06 ENCOUNTER — LAB REQUISITION (OUTPATIENT)
Dept: LAB | Facility: HOSPITAL | Age: 68
End: 2018-06-06

## 2018-06-06 DIAGNOSIS — Z00.00 ENCOUNTER FOR GENERAL ADULT MEDICAL EXAMINATION WITHOUT ABNORMAL FINDINGS: ICD-10-CM

## 2018-06-06 LAB
ALBUMIN SERPL-MCNC: 3.8 G/DL (ref 3.5–5)
ALP SERPL-CCNC: 57 U/L (ref 24–120)
ALT SERPL W P-5'-P-CCNC: 23 U/L (ref 0–54)
ANION GAP SERPL CALCULATED.3IONS-SCNC: 10 MMOL/L (ref 4–13)
ARTICHOKE IGE QN: 50 MG/DL (ref 0–99)
AST SERPL-CCNC: 32 U/L (ref 7–45)
BASOPHILS # BLD AUTO: 0.03 10*3/MM3 (ref 0–0.2)
BASOPHILS NFR BLD AUTO: 0.8 % (ref 0–2)
BILIRUB CONJ SERPL-MCNC: 0 MG/DL (ref 0–0.3)
BILIRUB INDIRECT SERPL-MCNC: 0.2 MG/DL (ref 0–1.1)
BILIRUB SERPL-MCNC: 0.3 MG/DL (ref 0.1–1)
BUN BLD-MCNC: 13 MG/DL (ref 5–21)
BUN/CREAT SERPL: 21.7 (ref 7–25)
CALCIUM SPEC-SCNC: 9.9 MG/DL (ref 8.4–10.4)
CHLORIDE SERPL-SCNC: 102 MMOL/L (ref 98–110)
CHOLEST SERPL-MCNC: 127 MG/DL (ref 130–200)
CO2 SERPL-SCNC: 27 MMOL/L (ref 24–31)
CREAT BLD-MCNC: 0.6 MG/DL (ref 0.5–1.4)
DEPRECATED RDW RBC AUTO: 41.5 FL (ref 40–54)
EOSINOPHIL # BLD AUTO: 0.12 10*3/MM3 (ref 0–0.7)
EOSINOPHIL NFR BLD AUTO: 3.3 % (ref 0–4)
ERYTHROCYTE [DISTWIDTH] IN BLOOD BY AUTOMATED COUNT: 13.2 % (ref 12–15)
GFR SERPL CREATININE-BSD FRML MDRD: 99 ML/MIN/1.73
GLUCOSE BLD-MCNC: 117 MG/DL (ref 70–100)
HBA1C MFR BLD: 5.4 %
HCT VFR BLD AUTO: 28.9 % (ref 37–47)
HDLC SERPL-MCNC: 63 MG/DL
HGB BLD-MCNC: 9.3 G/DL (ref 12–16)
IMM GRANULOCYTES # BLD: 0 10*3/MM3 (ref 0–0.03)
IMM GRANULOCYTES NFR BLD: 0 % (ref 0–5)
LDLC/HDLC SERPL: 0.67 {RATIO}
LYMPHOCYTES # BLD AUTO: 1.58 10*3/MM3 (ref 0.72–4.86)
LYMPHOCYTES NFR BLD AUTO: 43.5 % (ref 15–45)
MCH RBC QN AUTO: 27.7 PG (ref 28–32)
MCHC RBC AUTO-ENTMCNC: 32.2 G/DL (ref 33–36)
MCV RBC AUTO: 86 FL (ref 82–98)
MONOCYTES # BLD AUTO: 0.38 10*3/MM3 (ref 0.19–1.3)
MONOCYTES NFR BLD AUTO: 10.5 % (ref 4–12)
NEUTROPHILS # BLD AUTO: 1.52 10*3/MM3 (ref 1.87–8.4)
NEUTROPHILS NFR BLD AUTO: 41.9 % (ref 39–78)
NRBC BLD MANUAL-RTO: 0 /100 WBC (ref 0–0)
PLATELET # BLD AUTO: 503 10*3/MM3 (ref 130–400)
PMV BLD AUTO: 9.4 FL (ref 6–12)
POTASSIUM BLD-SCNC: 3.9 MMOL/L (ref 3.5–5.3)
PREALB SERPL-MCNC: 18.5 MG/DL (ref 18–36)
PROT SERPL-MCNC: 6.5 G/DL (ref 6.3–8.7)
RBC # BLD AUTO: 3.36 10*6/MM3 (ref 4.2–5.4)
SODIUM BLD-SCNC: 139 MMOL/L (ref 135–145)
TRIGL SERPL-MCNC: 109 MG/DL (ref 0–149)
TSH SERPL DL<=0.05 MIU/L-ACNC: 0.56 MIU/ML (ref 0.47–4.68)
VIT B12 BLD-MCNC: 307 PG/ML (ref 239–931)
WBC NRBC COR # BLD: 3.63 10*3/MM3 (ref 4.8–10.8)

## 2018-06-06 PROCEDURE — 85025 COMPLETE CBC W/AUTO DIFF WBC: CPT | Performed by: FAMILY MEDICINE

## 2018-06-06 PROCEDURE — 80076 HEPATIC FUNCTION PANEL: CPT | Performed by: FAMILY MEDICINE

## 2018-06-06 PROCEDURE — 80061 LIPID PANEL: CPT | Performed by: FAMILY MEDICINE

## 2018-06-06 PROCEDURE — 80048 BASIC METABOLIC PNL TOTAL CA: CPT | Performed by: FAMILY MEDICINE

## 2018-06-06 PROCEDURE — 83036 HEMOGLOBIN GLYCOSYLATED A1C: CPT | Performed by: FAMILY MEDICINE

## 2018-06-06 PROCEDURE — 82607 VITAMIN B-12: CPT | Performed by: FAMILY MEDICINE

## 2018-06-06 PROCEDURE — 84443 ASSAY THYROID STIM HORMONE: CPT | Performed by: FAMILY MEDICINE

## 2018-06-06 PROCEDURE — 84134 ASSAY OF PREALBUMIN: CPT | Performed by: FAMILY MEDICINE

## 2018-06-06 PROCEDURE — 36415 COLL VENOUS BLD VENIPUNCTURE: CPT | Performed by: FAMILY MEDICINE

## 2018-07-16 NOTE — ED PROVIDER NOTES
[address] [facility] EMERGENCY DEPT  eMERGENCY dEPARTMENT eNCOUnter      Pt Name: Essence Dugan  MRN: [id number]  [place] 1950  Date of evaluation: 7/15/2018  Provider: Aayush Olivares MD    [address] [place] [address]       Chief Complaint   Patient presents with    Fall     laceration to right eyebrow; fell out of a wheelchair         HISTORY OF PRESENT ILLNESS   (Location/Symptom, Timing/Onset, Context/Setting, Quality, Duration, Modifying Factors, Severity)  Note limiting factors. Essence Dugan is a 70 y.o. female who presents to the emergency department following a fall. Apparently, this evening while at the rest home pt had a fall. She has a hx of dementia and according to her sister the pt is falling more frequently. She has a small 2 cm lac to her right eyebrow. No LOC that has been reported. No apparent further injuries. Pt is in c-collar. She is on plavix. HPI    Nursing Notes were reviewed. REVIEW OF SYSTEMS    (2-9 systems for level 4, 10 or more for level 5)     Review of Systems   Constitutional: Negative for activity change, appetite change, chills, fatigue and fever. HENT: Negative for congestion, ear pain, rhinorrhea, sore throat and trouble swallowing. Eyes: Negative for photophobia, pain and visual disturbance. Respiratory: Negative for cough, chest tightness, shortness of breath and wheezing. Cardiovascular: Negative for chest pain, palpitations and leg swelling. Gastrointestinal: Negative for abdominal distention, abdominal pain, constipation, diarrhea, nausea and vomiting. Genitourinary: Negative for difficulty urinating, dysuria, flank pain, urgency, vaginal bleeding and vaginal discharge. Musculoskeletal: Negative for back pain, myalgias and neck stiffness. Skin: Positive for wound (Laceration above right eyebrow). Negative for color change, pallor and rash. Neurological: Negative for dizziness, weakness, light-headedness, numbness and headaches.    Psychiatric/Behavioral: to self. She moves all extremities. She has good strength in all extremities. Skin: Skin is warm and dry. No rash noted. Psychiatric: She has a normal mood and affect. Her behavior is normal. Judgment and thought content normal.   Nursing note and vitals reviewed. DIAGNOSTIC RESULTS     EKG: All EKG's are interpreted by the Emergency Department Physician who either signs or Co-signs this chart in the absence of a cardiologist.    Normal sinus rhythm with a rate of 98, left axis deviation, no acute ST elevations or depressions noted. RADIOLOGY:   Non-plain film images such as CT, Ultrasound and MRI are read by the radiologist. Plain radiographic images are visualized and preliminarily interpreted by the emergency physician with the below findings:    CT C SPINE:    No acute fracture or traumatic malalignment. Multilevel degenerative changes. Reticular and ground glass opacities within the upper lungs, may represent atelectasis and scaring versus mild pulmonary vascular congestion. CT HEAD:    No acute intracranial abnormality. Chronic small vessel ischemic disease and senescent changes    No acute osseous abnormality. Minimal mucosal thickening in the present sinuses. CT Head WO Contrast    (Results Pending)   CT Cervical Spine WO Contrast    (Results Pending)           LABS:  Labs Reviewed   URINE RT REFLEX TO CULTURE - Abnormal; Notable for the following:        Result Value    Ketones, Urine 15 (*)     Leukocyte Esterase, Urine MODERATE (*)     All other components within normal limits   URINE CULTURE   MICROSCOPIC URINALYSIS       All other labs were within normal range or not returned as of this dictation.     EMERGENCY DEPARTMENT COURSE and DIFFERENTIAL DIAGNOSIS/MDM:   Vitals:    Vitals:    07/15/18 2139 07/16/18 0000 07/16/18 0050 07/16/18 0145   BP: 136/85 102/78 98/76 102/72   Pulse: 102 98 94 90   Resp: 18 18 18 18   Temp: 98 °F (36.7 °C)      SpO2: 96% 97% 97% 97%   Weight: 130 lb (59 kg)      Height: 5' 2\" (1.575 m)          MDM  Number of Diagnoses or Management Options  Fall, initial encounter: new and requires workup  Laceration of scalp, initial encounter: new and requires workup  Diagnosis management comments: Patient appears to have sustained only a minor laceration to just above the eyebrow following her fall this evening. The head CT and CT of her C-spine were both negative. The wound was closed easily with tissue adhesive. Patient did not have a urinary tract infection that may contributing to the fall this evening. She does not appear to have any weakness. Patient is awake and alert, but secondary to her dementia is confused to time and date. Overall she looks well. Her vital signs are stable. There were instructed on local wound care. We will discharge her back to the rest home. Patient Progress  Patient progress: stable      Reassessment      CONSULTS:  None    PROCEDURES:  Unless otherwise noted below, none     Procedures    FINAL IMPRESSION      1. Laceration of scalp, initial encounter    2.  Fall, initial encounter          DISPOSITION/PLAN   DISPOSITION Decision To Discharge 07/16/2018 02:21:26 AM      PATIENT REFERRED TO:  Ángela Jenkins MD  [address] [place] [address]  [phone]    Call in 3 days  For follow up, As needed      DISCHARGE MEDICATIONS:  New Prescriptions    No medications on file          (Please note that portions of this note were completed with a voice recognition program.  Efforts were made to edit the dictations but occasionally words are mis-transcribed.)    Melissa Carroll MD (electronically signed)  Attending Emergency Physician          Sanket Schreiber MD  07/16/18 5260

## 2018-07-16 NOTE — ED NOTES
Face sheet faxed to [facility] called for transport back to [facility]  07/16/18 8817

## 2018-07-20 ENCOUNTER — LAB REQUISITION (OUTPATIENT)
Dept: LAB | Facility: HOSPITAL | Age: 68
End: 2018-07-20

## 2018-07-20 DIAGNOSIS — Z00.00 ENCOUNTER FOR GENERAL ADULT MEDICAL EXAMINATION WITHOUT ABNORMAL FINDINGS: ICD-10-CM

## 2018-07-20 LAB — VALPROATE SERPL-MCNC: 30.2 MCG/ML (ref 50–100)

## 2018-07-20 PROCEDURE — 80164 ASSAY DIPROPYLACETIC ACD TOT: CPT | Performed by: FAMILY MEDICINE

## 2018-07-20 PROCEDURE — 36415 COLL VENOUS BLD VENIPUNCTURE: CPT | Performed by: FAMILY MEDICINE

## 2018-08-30 ENCOUNTER — LAB REQUISITION (OUTPATIENT)
Dept: LAB | Facility: HOSPITAL | Age: 68
End: 2018-08-30

## 2018-08-30 DIAGNOSIS — Z00.00 ENCOUNTER FOR GENERAL ADULT MEDICAL EXAMINATION WITHOUT ABNORMAL FINDINGS: ICD-10-CM

## 2018-08-30 LAB — VALPROATE SERPL-MCNC: 88.2 MCG/ML (ref 50–100)

## 2018-08-30 PROCEDURE — 80164 ASSAY DIPROPYLACETIC ACD TOT: CPT | Performed by: FAMILY MEDICINE

## 2018-09-05 ENCOUNTER — LAB REQUISITION (OUTPATIENT)
Dept: LAB | Facility: HOSPITAL | Age: 68
End: 2018-09-05

## 2018-09-05 DIAGNOSIS — Z00.00 ENCOUNTER FOR GENERAL ADULT MEDICAL EXAMINATION WITHOUT ABNORMAL FINDINGS: ICD-10-CM

## 2018-09-05 LAB — VALPROATE SERPL-MCNC: 51.7 MCG/ML (ref 50–100)

## 2018-09-05 PROCEDURE — 36415 COLL VENOUS BLD VENIPUNCTURE: CPT | Performed by: FAMILY MEDICINE

## 2018-09-05 PROCEDURE — 80164 ASSAY DIPROPYLACETIC ACD TOT: CPT | Performed by: FAMILY MEDICINE

## 2018-10-30 NOTE — ED NOTES
Attempted to [facility], no answer.       Romel Prieto RN  10/29/18 9651

## 2018-10-30 NOTE — ED PROVIDER NOTES
forehead, initial encounter    4.  Superficial laceration of face          DISPOSITION/PLAN   DISPOSITION Decision To Discharge 10/29/2018 10:47:09 PM      PATIENT REFERRED TO:  @FUP@    DISCHARGE MEDICATIONS:  New Prescriptions    No medications on file          (Please note that portions of this note were completed with a voice recognition program.  Efforts were made to edit the dictations butoccasionally words are mis-transcribed.)    Alfonso Quintanilla MD (electronically signed)  AttendingEmergency Physician          Alfonso Quintanilla MD  10/29/18 7907

## 2018-12-05 NOTE — ED TRIAGE NOTES
Pt arrives to ED from [facility] via [facility]. Pt reports to ED with a complaint of fever. Pt has 99.1 fever (temporal).

## 2018-12-06 PROBLEM — G93.41 ACUTE METABOLIC ENCEPHALOPATHY: Status: ACTIVE | Noted: 2018-01-01

## 2018-12-06 PROBLEM — A41.9 SEPSIS (HCC): Status: ACTIVE | Noted: 2018-01-01

## 2018-12-06 PROBLEM — N30.00 ACUTE CYSTITIS: Status: ACTIVE | Noted: 2018-01-01

## 2018-12-06 PROBLEM — E87.0 HYPERNATREMIA: Status: ACTIVE | Noted: 2018-01-01

## 2018-12-06 PROBLEM — R93.0 ABNORMAL CT OF THE HEAD: Status: ACTIVE | Noted: 2018-01-01

## 2018-12-06 NOTE — H&P
UNIT/ML injection vial INJECT 50 UNITS INTO THE SKIN NIGHTLY FOR BLOOD SUGAR 1/22/18   Behzad Catalan MD   donepezil (ARICEPT) 10 MG tablet TAKE (1) TABLET IN THE EVENING 11/28/17   Behzad Catalan MD   metFORMIN (GLUCOPHAGE) 1000 MG tablet TAKE (1) TABLET TWICE A DAY WITH MEALS (BREAKFAST AND SUPPER) 8/24/17   Behzad Catalan MD   citalopram (CELEXA) 10 MG tablet TAKE ONE TABLET BY MOUTH EACH EVENING AT BEDTIME. 8/10/17   DEVIKA Contreras   QUEtiapine (SEROQUEL) 50 MG tablet TAKE (2) TABLETS IN THE EVENING 7/21/17   Behzad Catalan MD   metoclopramide (REGLAN) 10 MG tablet TAKE (1) TABLET FOUR TIMES DAILY 7/21/17   Behzad Catalan MD   melatonin 3 MG TABS tablet Take 3 mg by mouth daily    Historical Provider, MD   Acetaminophen (TYLENOL 8 HOUR PO) Take 325 mg by mouth     Historical Provider, MD   aspirin 81 MG tablet Take 81 mg by mouth daily. Historical Provider, MD       Allergies:    Adhesive tape    Social History:    The patient currently lives at a facility  Tobacco:   reports that she has never smoked. She has never used smokeless tobacco.  Alcohol:   reports that she does not drink alcohol. Illicit Drugs: sister denies previous use    Family History:  Family History   Problem Relation Age of Onset    Ovarian Cancer Mother     Diabetes Mother     Stroke Father     Breast Cancer Sister     Prostate Cancer Maternal Grandfather        Review of Systems:   As per HPI, rest of 14 point ROS reviewed and negative. Physical Examination:  /80   Pulse 111   Temp 101.3 °F (38.5 °C) (Rectal)   Resp 19   Ht 5' 3\" (1.6 m)   Wt 120 lb (54.4 kg)   SpO2 97%   BMI 21.26 kg/m²   General appearance: somnolent, non-verbal, opening eyes to family request but not for me. Protecting airway. Normal respirations. Head: Normocephalic, without obvious abnormality, atraumatic  Eyes: conjunctivae/corneas clear. PERRL, EOM's intact.    Ears: normal external ears  Neck: no Lab   Bilirubin Urine Negative  Negative Final 12/05/2018  9:03 AM [facility]   Ketones, Urine 15   Negative mg/dL Final 12/05/2018  9:03 AM [facility]   Specific [place], UA 1.026  1.005 - 1.030 Final 12/05/2018  9:03 AM [facility]   Blood, Urine Negative  Negative Final 12/05/2018  9:03 AM [facility]   pH, UA 6.0  5.0 - 8.0 Final 12/05/2018  9:03 AM [facility]   Protein, UA Negative  Negative mg/dL Final 12/05/2018  9:03 AM [facility]   Urobilinogen, Urine 0.2  <2.0 E.U./dL Final 12/05/2018  9:03 AM [facility]   Nitrite, Urine POSITIVE   Negative Final 12/05/2018  9:03 AM [facility]   Leukocyte Esterase, Urine Negative  Negative Final 12/05/2018  9:03 AM [facility]   Testing Performed By     [facility] [address] [place] Name Director Address Valid Date Range   [address] LAB Kun Toscano  [facility],[address]  [address] [facility] [address] 08/30/17 1233-Present   12/5/2018  9:25 AM - Franco Rodarte Incoming Lab Results From [employer]     Component Results     Component Value Ref Range & Units Status Collected Lab   Bacteria, UA 1+  /HPF Final 12/05/2018  9:03 AM [facility]   Hyaline Casts, UA 7  0 - 8 /HPF Final 12/05/2018  9:03 AM [facility]   WBC, UA 6   0 - 5 /HPF Final 12/05/2018  9:03 AM [facility]   RBC, UA 1  0 - 4 /HPF Final 12/05/2018  9:03 AM [facility]   Epi Cells 1  0 - 5 /HPF Final 12/05/2018  9:03 AM [facility]         RAD:   CT Head WO Contrast [[id number]] Resulted: 12/06/18 0932      Order Status: Completed Updated: 12/06/18 0934     Narrative:       EXAMINATION: CT HEAD WO CONTRAST 12/6/2018 9:29 AM  HISTORY: Acute encephalopathy. DOSE: 773 mGycm.  Automatic exposure control was utilized in an effort  to use as little radiation as possible, without compromising Performed By     [address] [place] Name Director Address Valid Date Range   [address] LAB Shadi Choe  [address],[address]  [address] [address] 08/30/17 1233-Present   Narrative     ORDER#: [id number]                          ORDERED BY: Aidee Israel  SOURCE: Urine Clean Catch                  COLLECTED:  12/05/18 09:12  ANTIBIOTICS AT ROBER. :                      RECEIVED :  12/05/18 09:12         Active Hospital Problems    Diagnosis Date Noted    Acute metabolic encephalopathy [G15.87] 12/06/2018    Sepsis ([facility] [address] [A41.9] 12/06/2018    Acute cystitis [N30.00] 12/06/2018    Hypernatremia [E87.0] 12/06/2018    Abnormal CT of the head [R93.0] 12/06/2018    Dementia without behavioral disturbance [F03.90] 04/27/2016       IMPRESSION / PLAN:  Principal Problem:    Sepsis ([facility] [address] - fever, tachycardia, AMS, uti. LA is normal. WBC is normal. BP is normal. Given ceftriaxone yesterday and earlier in ER. Will do cefepime for now per sepsis protocol but can likely rapidly de-escalate as cultures return. ER urine today already looks clearer than yesterdays. Continue ivf and supportive treatment. Trend LA per protocol. Active Problems:    Dementia without behavioral disturbance - rapid decline over last 6-8 months. Previous gait issues. CT head questioning possible NPH but enlarged ventricles could just be part of her disease process. Will get MRI of the head. Will get palliative evaluation. Already DNR but pt not eating and POA states no plans for feeding tube. Acute metabolic encephalopathy - on top of dementia, in setting of UTI/sepsis. Treat underlying infection and monitor     Acute cystitis - as above, cefepime, f/u cultures. Previous u/a and culture from 12/5 listed above      Hypernatremia - 156 in setting of poor po intake and excessive free water loss. IVF resus w/ d5 1/2 ns and monitor Na q4 hrs. DM2 - pt will be npo at this time due mental status. Will cut lantus down from 50 to 20 units. NPO iss. Abnormal CT of the head - as above, will check MRI head to look for NPH vs age changes/alzhiemers. Can proceed from there with NSx eval if appropriate and family wants. Palliative care encounter - discussed patients rapid decline and inability to eat/drink. Already DNR by living will and POA agrees with that. States pt previously stated she would not want a feeding tube and POA has no interested in pursing one. Discussed need for possible hospice pending clinical course. Will consult palliative care team for eval and family support/information as they make decisions on goals of care. About 15 minutes spent on discussion.        Paras Agee, DO  12/6/2018

## 2018-12-06 NOTE — PROGRESS NOTES
BLOOD GAS, ARTERIAL   Status:  Final result    Ref Range & Units 12/06/18 0853   pH, Arterial 7.350 - 7.450 7.450    pCO2, Arterial 35.0 - 45.0 mmHg 44.0    pO2, Arterial 80.0 - 100.0 mmHg 58.0     HCO3, Arterial 22.0 - 26.0 mmol/L 30.6     Base Excess, Arterial -2.0 - 2.0 mmol/L 5.8     Hemoglobin, Art, Extended 12.0 - 16.0 g/dL 14.5    O2 Sat, Arterial >92 % 89.6     Carboxyhgb, Arterial 0.0 - 5.0 % 1.8    Comment:      0.0-1.5   (Smokers 1.5-5.0)    Methemoglobin, Arterial <1.5 % 1.3    O2 Content, Arterial Not Established mL/dL 18.2    O2 Therapy  Unknown    Resulting Agency  [facility]        Specimen Collected: 12/06/18 0853 Last Resulted: 12/06/18 0854 View Other Order Details        Pt on room air, RR 22 RR (Choice)

## 2018-12-07 PROBLEM — N30.00 ACUTE CYSTITIS: Status: RESOLVED | Noted: 2018-01-01 | Resolved: 2018-01-01

## 2018-12-07 PROBLEM — A41.9 SEPSIS (HCC): Status: RESOLVED | Noted: 2018-01-01 | Resolved: 2018-01-01

## 2018-12-07 PROBLEM — G93.41 ACUTE METABOLIC ENCEPHALOPATHY: Status: RESOLVED | Noted: 2018-01-01 | Resolved: 2018-01-01

## 2018-12-07 NOTE — DISCHARGE SUMMARY
present. Heart size is normal. There is no pneumothorax or pleural effusion. The osseous structures and upper abdomen appear unremarkable. No active disease. Signed by Dr Eden Angelo. Marian on 12/6/2018 8:38 AM    Mri Brain Wo Contrast    Result Date: 12/6/2018  Examination. MRI BRAIN WO CONTRAST History: Abnormal head CT. History of normal pressure hydrocephalus. The multiplanar, multisequence MR imaging of the brain is performed without intravenous contrast enhancement. There is no previous study for comparison. The correlation is made with the previous CT scan of the head obtained earlier today. There are extensive motion artifacts limiting the diagnostic yield of this study. The diffusion-weighted imaging sequence including the ADC map show no areas of restricted diffusion. There is no evidence of mass or midline shift. There is mild dilatation of ventricles, basal cistern and the cortical sulci suggesting chronic volume loss/atrophy. There is evidence of a cavum septum pellucidum and cavum virgae. The orbits bilaterally, the optic nerves and optic chiasm appear normal. The seventh and eighth lateral border bilaterally are normal and symmetrical. The pituitary gland appears normal. The corpus callosum, the brainstem and cerebellum appear normal. The FLAIR sequence images show extensive chronic white matter ischemic changes bilaterally more severely in the periventricular region. The gradient recalled imaging sequence show normal flow in the visualized intracranial vessels. No abnormal focal dilatation or significant visible stenosis. No evidence of intracranial hemorrhage or hematoma. Mild mucosal thickening of the ethmoid sinuses are seen. The mastoid air cells are clear. No acute intracranial abnormality. Chronic ischemic and atrophic changes. Evidence of ethmoid sinusitis.  Signed by Dr Elizabeth Shoemaker on 12/6/2018 5:03 PM      Pertinent Labs:   CBC:   Recent Labs      12/05/18   0709  12/06/18   0840 tablet  TAKE (1) TABLET BY MOUTH DAILY             divalproex (DEPAKOTE) 500 MG DR tablet  Take 500 mg by mouth 2 times daily             donepezil (ARICEPT) 10 MG tablet  TAKE (1) TABLET IN THE EVENING             DULoxetine (CYMBALTA) 60 MG extended release capsule  Take 60 mg by mouth daily             ferrous sulfate 325 (65 Fe) MG tablet  Take 325 mg by mouth daily (with breakfast)             gabapentin (NEURONTIN) 300 MG capsule  Take 300 mg by mouth 3 times daily. .             insulin aspart (NOVOLOG FLEXPEN) 100 UNIT/ML injection pen               insulin glargine (BASAGLAR KWIKPEN) 100 UNIT/ML injection pen  Inject into the skin             LANTUS 100 UNIT/ML injection vial  INJECT 50 UNITS INTO THE SKIN NIGHTLY FOR BLOOD SUGAR             lurasidone (LATUDA) 40 MG TABS tablet  Take 20 mg by mouth daily             magnesium oxide (MAG-OX) 400 MG tablet  Take 400 mg by mouth 2 times daily             melatonin 3 MG TABS tablet  Take 3 mg by mouth daily             memantine (NAMENDA) 10 MG tablet  Take 10 mg by mouth 2 times daily             metFORMIN (GLUCOPHAGE) 1000 MG tablet  TAKE (1) TABLET TWICE A DAY WITH MEALS (BREAKFAST AND SUPPER)             metoclopramide (REGLAN) 10 MG tablet  TAKE (1) TABLET FOUR TIMES DAILY             mirtazapine (REMERON) 15 MG tablet  Take 7.5 mg by mouth nightly             QUEtiapine (SEROQUEL) 50 MG tablet  TAKE (2) TABLETS IN THE EVENING             traZODone (DESYREL) 50 MG tablet  Take 50 mg by mouth nightly                 Discharge Instructions: Follow up with hospice intake today  Take medications as directed. Resume activity as tolerated. Diet: Diet NPO Effective Now     Disposition: Patient condition is guarded to d/c to SNF with hospice    Time spent on discharge > 30 minutes, the majority of which in counseling and coordination of care with family and hospice.     Signed:  Pam Manrique MD

## 2018-12-07 NOTE — PLAN OF CARE
Problem: Risk for Impaired Skin Integrity  Goal: Tissue integrity - skin and mucous membranes  Structural intactness and normal physiological function of skin and  mucous membranes.    Outcome: Ongoing      Problem: Falls - Risk of:  Goal: Will remain free from falls  Will remain free from falls   Outcome: Met This Shift      Problem: PAIN  Goal: Patient's pain/discomfort is manageable  Outcome: Met This Shift

## 2018-12-10 ENCOUNTER — CARE COORDINATION (OUTPATIENT)
Dept: CARE COORDINATION | Age: 68
End: 2018-12-10

## 2018-12-10 LAB
BLOOD CULTURE, ROUTINE: NORMAL
CULTURE, BLOOD 2: NORMAL

## 2018-12-11 LAB
BLOOD CULTURE, ROUTINE: NORMAL
CULTURE, BLOOD 2: NORMAL
